# Patient Record
Sex: FEMALE | Race: WHITE | NOT HISPANIC OR LATINO | Employment: FULL TIME | URBAN - METROPOLITAN AREA
[De-identification: names, ages, dates, MRNs, and addresses within clinical notes are randomized per-mention and may not be internally consistent; named-entity substitution may affect disease eponyms.]

---

## 2017-01-07 ENCOUNTER — TELEPHONE (OUTPATIENT)
Dept: NURSING | Facility: CLINIC | Age: 27
End: 2017-01-07

## 2017-01-07 NOTE — TELEPHONE ENCOUNTER
"Call Type: Triage Call    Presenting Problem: Patient is\" pregnant 19 weeks\" [place] 06/06/17. and  has a fever of 101.3 tympanically along with cold symptoms. Patient  was advised to continue to drink plenty of fluids and take tylenol  as needed for \"fever and discomfort.\" Patient to follow up with  urgent care in am.  Triage Note:  Guideline Title: No Guideline - Advice Per Reference (Adult)  Recommended Disposition: See Provider within 24 hours  Original Inclination: Wanted to speak with a nurse  Override Disposition:  Intended Action: See Dr/Make Appt  Physician Contacted: No  SEE PROVIDER WITHIN 24 HOURS ?  YES  SEE ED IMMEDIATELY ? NO  CALL [facility] IMMEDIATELY ? NO  CALL LOCAL AGENCY IMMEDIATELY ? NO  SEE DENTIST WITHIN 4 HOURS ? NO  ACTIVATE  ? NO  SEE PROVIDER WITHIN 4 HOURS ? NO  CALL PROVIDER IMMEDIATELY ? NO  Physician Instructions:  Care Advice:  "

## 2017-01-09 ENCOUNTER — MYC MEDICAL ADVICE (OUTPATIENT)
Dept: OBGYN | Facility: OTHER | Age: 27
End: 2017-01-09

## 2017-01-09 ENCOUNTER — OFFICE VISIT (OUTPATIENT)
Dept: FAMILY MEDICINE | Facility: OTHER | Age: 27
End: 2017-01-09
Payer: COMMERCIAL

## 2017-01-09 VITALS
WEIGHT: 200 LBS | BODY MASS INDEX: 34.15 KG/M2 | TEMPERATURE: 98.1 F | OXYGEN SATURATION: 100 % | SYSTOLIC BLOOD PRESSURE: 122 MMHG | HEART RATE: 92 BPM | HEIGHT: 64 IN | DIASTOLIC BLOOD PRESSURE: 70 MMHG | RESPIRATION RATE: 20 BRPM

## 2017-01-09 DIAGNOSIS — H66.001 ACUTE SUPPURATIVE OTITIS MEDIA OF RIGHT EAR WITHOUT SPONTANEOUS RUPTURE OF TYMPANIC MEMBRANE, RECURRENCE NOT SPECIFIED: Primary | ICD-10-CM

## 2017-01-09 PROCEDURE — 99213 OFFICE O/P EST LOW 20 MIN: CPT | Performed by: PHYSICIAN ASSISTANT

## 2017-01-09 RX ORDER — AMOXICILLIN 500 MG/1
500 CAPSULE ORAL 2 TIMES DAILY
Qty: 20 CAPSULE | Refills: 0 | Status: SHIPPED | OUTPATIENT
Start: 2017-01-09 | End: 2017-01-16

## 2017-01-09 NOTE — MR AVS SNAPSHOT
After Visit Summary   1/9/2017    Scotty Garber    MRN: [id number]           Patient Information     Date Of Birth          1990        Visit Information        Provider Department      1/9/2017 11:45 AM Nhung Miner PA-C [facility]        Today's Diagnoses     Acute suppurative otitis media of right ear without spontaneous rupture of tympanic membrane, recurrence not specified    -  1       Care Instructions                    Middle Ear Infection (Otitis Media)            What is a middle ear infection?   A middle ear infection is an infection of the space in the ear behind the eardrum. Anyone can get an ear infection, but ear infections are more common in children less than 8 years old.   How does it occur?   Ear infections usually begin with a viral infection of the nose and throat. For example, a cold might lead to an infection of the ear. Ear infections may also occur when you have allergies. The viral infection or allergic reaction can cause swelling of the tube between your ear and throat (the eustachian tube). The swelling may trap bacteria in your middle ear, resulting in a bacterial infection.   Pressure from the buildup of pus or fluid in the ear sometimes causes the eardrum to tear (rupture). The eardrum is a thin membrane that separates and protects the delicate parts of the middle ear from the air and moisture in the ear canal.   What are the symptoms?   You may have one or more of these symptoms:   earache   hearing loss   feeling of blockage in the ear   fever   dizziness.   How is it diagnosed?   Your healthcare provider will ask about your symptoms and look at your eardrum.   Your healthcare provider may use a special light to look into the ear canal and check for fluid in the ear. Your provider will look at how the eardrum moves when a puff of air is blown into the ear. If there is fluid behind the eardrum, the membrane will not move  well.   How is it treated?   Antibiotic medicine is a common treatment for ear infections. However, recent studies have shown that the symptoms of ear infections often go away in a couple of days without antibiotics. Bacteria can become resistant to antibiotics, and the medicine may cause side effects. For these reasons, your healthcare provider may wait 1 to 3 days to see if the symptoms go away on their own before prescribing an antibiotic.   Your provider may recommend a decongestant (tablets or a nasal spray) to help clear the eustachian tube. This may help relieve pressure in the middle ear. For pain take a nonprescription pain reliever such as acetaminophen (Tylenol) or ibuprofen. Check with your healthcare provider before you give any medicine that contains aspirin or salicylates to a child or teen. This includes medicines like baby aspirin, some cold medicines, and Pepto Bismol. Children and teens who take aspirin are at risk for a serious illness called Reye's syndrome. Aspirin and ibuprofen are nonsteroidal anti-inflammatory medicines (NSAIDs). NSAIDs may cause stomach bleeding and other problems. These risks increase with age. Read the label and take as directed. Unless recommended by your healthcare provider, do not take NSAIDs for more than 10 days for any reason.   How long will the effects last?   In most cases you should feel better in 2 to 3 days.   If you are taking an antibiotic and your eardrum has not returned to normal when your provider examines you again, you may need to take a different antibiotic or other medicine. In this case, it may take another 1 to 2 weeks before your ear feels normal again.   What can I do to take care of myself?   Follow your healthcare provider's instructions.   If you are taking an antibiotic, take all of it according to the directions, even when the symptoms have gone away before you have finished it.   To help relieve pain, put a warm moist washcloth or a hot  water bottle over the ear.   If you have discharge from your ear, you can wipe it away with a washcloth and loosely plug the ear with cotton to catch further drainage. Discharge from the ear can mean that you have an infection of the ear canal or, if you have a lot of fluid and pus draining from your ear, you may have a ruptured eardrum. Ask your healthcare provider how to care for the ear if you have discharge. If the discharge is caused by a ruptured eardrum, then you will need to protect the ear from water. You will need one or more follow-up appointments to check the healing of your eardrum.   If you have a fever:   Rest until your temperature has fallen below 100?F (37.8?C). Then become as active as is comfortable.   Ask your provider if you can take aspirin, acetaminophen, or ibuprofen to control your fever.   Keep a daily record of your temperature.   Call your healthcare provider if you have:   a temperature of 101.5 degrees F (38.6 degrees C) or higher that persists even after you take acetaminophen, aspirin, or ibuprofen   a severe headache or worsening pain around the ear   swelling around the ear   increasing dizziness   worsening of hearing problems   weakness of one side of your face.   Keep all your appointments. Your healthcare provider may want you to have one or more follow-up exams until signs of inflammation and infection have disappeared.   How can I prevent an ear infection from occurring?   If you tend to get ear infections often, ask your healthcare provider if you need to be checked for allergies. Getting treatment for allergies may help prevent ear infections.   Ask if using decongestants when you have a cold may help prevent you from getting ear infections.         Published by [employer].  This content is reviewed periodically and is subject to change as new health information becomes available. The information is intended to inform and educate and is not a replacement for medical  evaluation, advice, diagnosis or treatment by a healthcare professional.   Developed by [employer].   ? 2010 [facility] and/or its affiliates. All Rights Reserved.   Copyright   [employer] 2011              Follow-ups after your visit        Your next 10 appointments already scheduled     Jan 16, 2017 10:30 AM   US OB > 14 WEEKS COMPLETE SINGLE with [facility]US[facility]   [facility]    [address] [address] [phone]           Please bring a list of your medicines (including vitamins, minerals and over-the-counter drugs). Also, tell your doctor about any allergies you may have. Wear comfortable clothes and leave your valuables at home.  If you re less than 20 weeks drink four 8-ounce glasses of fluid an hour before your exam. If you need to empty your bladder before your exam, try to release only a little urine. Then, drink another glass of fluid.  You may have up to two family members in the exam room. If you bring a small child, an adult must be there to care for him or her.  Please call the Imaging Department at your exam site with any questions.            Jan 16, 2017 11:15 AM   ESTABLISHED PRENATAL with Violette Baltazar, DO   [facility]    [address] [address] [phone]              Who to contact     If you have questions or need follow up information about today's clinic visit or your schedule please contact [facility] directly at [phone].  Normal or non-critical lab and imaging results will be communicated to you by MyChart, letter or phone within 4 business days after the clinic has received the results. If you do not hear from us within 7 days, please contact the clinic through MyChart or phone. If you have a critical or abnormal lab result, we will notify you by phone as soon as possible.  Submit refill requests through [employer] or call  "your pharmacy and they will forward the refill request to us. Please allow 3 business days for your refill to be completed.          Additional Information About Your Visit        [employer] Information     [employer] gives you secure access to your electronic health record. If you see a primary care provider, you can also send messages to your care team and make appointments. If you have questions, please call your primary care clinic.  If you do not have a primary care provider, please call [phone] and they will assist you.        Care EveryWhere ID     This is your Care EveryWhere ID. This could be used by other organizations to access your [facility] medical records  [id number]        Your Vitals Were     Pulse Temperature Respirations Height BMI (Body Mass Index) Pulse Oximetry    92 98.1  F (36.7  C) (Oral) 20 5' 3.78\" (1.62 m) 34.57 kg/m2 100%    Last Period                   08/30/2016            Blood Pressure from Last 3 Encounters:   01/09/17 122/70   12/28/16 118/54   12/26/16 102/68    Weight from Last 3 Encounters:   01/09/17 200 lb (90.719 kg)   12/28/16 202 lb (91.627 kg)   12/26/16 200 lb (90.719 kg)              Today, you had the following     No orders found for display         Today's Medication Changes          These changes are accurate as of: 1/9/17 12:12 PM.  If you have any questions, ask your nurse or doctor.               Start taking these medicines.        Dose/Directions    amoxicillin 500 MG capsule   Commonly known as:  AMOXIL   Used for:  Acute suppurative otitis media of right ear without spontaneous rupture of tympanic membrane, recurrence not specified   Started by:  Nhung Miner PA-C        Dose:  500 mg   Take 1 capsule (500 mg) by mouth 2 times daily for 10 days   Quantity:  20 capsule   Refills:  0            Where to get your medicines      These medications were sent to [place] [address] [place] [facility] [place] - [address]  [address], [place] [address] "     Phone:  [phone]    - amoxicillin 500 MG capsule             Primary Care Provider Office Phone # Fax #    Linda Tanna Ruffin [phone]        [facility] [address] [place] [address]        Thank you!     Thank you for choosing [facility]  for your care. Our goal is always to provide you with excellent care. Hearing back from our patients is one way we can continue to improve our services. Please take a few minutes to complete the written survey that you may receive in the mail after your visit with us. Thank you!             Your Updated Medication List - Protect others around you: Learn how to safely use, store and throw away your medicines at www.disposemymeds.org.          This list is accurate as of: 1/9/17 12:12 PM.  Always use your most recent med list.                   Brand Name Dispense Instructions for use    amoxicillin 500 MG capsule    AMOXIL    20 capsule    Take 1 capsule (500 mg) by mouth 2 times daily for 10 days       PRENATAL VITAMIN PO          VITAMIN C PO      Take 1,000 mg by mouth

## 2017-01-09 NOTE — PATIENT INSTRUCTIONS
Middle Ear Infection (Otitis Media)            What is a middle ear infection?   A middle ear infection is an infection of the space in the ear behind the eardrum. Anyone can get an ear infection, but ear infections are more common in children less than 8 years old.   How does it occur?   Ear infections usually begin with a viral infection of the nose and throat. For example, a cold might lead to an infection of the ear. Ear infections may also occur when you have allergies. The viral infection or allergic reaction can cause swelling of the tube between your ear and throat (the eustachian tube). The swelling may trap bacteria in your middle ear, resulting in a bacterial infection.   Pressure from the buildup of pus or fluid in the ear sometimes causes the eardrum to tear (rupture). The eardrum is a thin membrane that separates and protects the delicate parts of the middle ear from the air and moisture in the ear canal.   What are the symptoms?   You may have one or more of these symptoms:   earache   hearing loss   feeling of blockage in the ear   fever   dizziness.   How is it diagnosed?   Your healthcare provider will ask about your symptoms and look at your eardrum.   Your healthcare provider may use a special light to look into the ear canal and check for fluid in the ear. Your provider will look at how the eardrum moves when a puff of air is blown into the ear. If there is fluid behind the eardrum, the membrane will not move well.   How is it treated?   Antibiotic medicine is a common treatment for ear infections. However, recent studies have shown that the symptoms of ear infections often go away in a couple of days without antibiotics. Bacteria can become resistant to antibiotics, and the medicine may cause side effects. For these reasons, your healthcare provider may wait 1 to 3 days to see if the symptoms go away on their own before prescribing an antibiotic.   Your provider may recommend a  decongestant (tablets or a nasal spray) to help clear the eustachian tube. This may help relieve pressure in the middle ear. For pain take a nonprescription pain reliever such as acetaminophen (Tylenol) or ibuprofen. Check with your healthcare provider before you give any medicine that contains aspirin or salicylates to a child or teen. This includes medicines like baby aspirin, some cold medicines, and Pepto Bismol. Children and teens who take aspirin are at risk for a serious illness called Reye's syndrome. Aspirin and ibuprofen are nonsteroidal anti-inflammatory medicines (NSAIDs). NSAIDs may cause stomach bleeding and other problems. These risks increase with age. Read the label and take as directed. Unless recommended by your healthcare provider, do not take NSAIDs for more than 10 days for any reason.   How long will the effects last?   In most cases you should feel better in 2 to 3 days.   If you are taking an antibiotic and your eardrum has not returned to normal when your provider examines you again, you may need to take a different antibiotic or other medicine. In this case, it may take another 1 to 2 weeks before your ear feels normal again.   What can I do to take care of myself?   Follow your healthcare provider's instructions.   If you are taking an antibiotic, take all of it according to the directions, even when the symptoms have gone away before you have finished it.   To help relieve pain, put a warm moist washcloth or a hot water bottle over the ear.   If you have discharge from your ear, you can wipe it away with a washcloth and loosely plug the ear with cotton to catch further drainage. Discharge from the ear can mean that you have an infection of the ear canal or, if you have a lot of fluid and pus draining from your ear, you may have a ruptured eardrum. Ask your healthcare provider how to care for the ear if you have discharge. If the discharge is caused by a ruptured eardrum, then you will  need to protect the ear from water. You will need one or more follow-up appointments to check the healing of your eardrum.   If you have a fever:   Rest until your temperature has fallen below 100?F (37.8?C). Then become as active as is comfortable.   Ask your provider if you can take aspirin, acetaminophen, or ibuprofen to control your fever.   Keep a daily record of your temperature.   Call your healthcare provider if you have:   a temperature of 101.5 degrees F (38.6 degrees C) or higher that persists even after you take acetaminophen, aspirin, or ibuprofen   a severe headache or worsening pain around the ear   swelling around the ear   increasing dizziness   worsening of hearing problems   weakness of one side of your face.   Keep all your appointments. Your healthcare provider may want you to have one or more follow-up exams until signs of inflammation and infection have disappeared.   How can I prevent an ear infection from occurring?   If you tend to get ear infections often, ask your healthcare provider if you need to be checked for allergies. Getting treatment for allergies may help prevent ear infections.   Ask if using decongestants when you have a cold may help prevent you from getting ear infections.         Published by [employer].  This content is reviewed periodically and is subject to change as new health information becomes available. The information is intended to inform and educate and is not a replacement for medical evaluation, advice, diagnosis or treatment by a healthcare professional.   Developed by [employer].   ? 2010 [employer] and/or its affiliates. All Rights Reserved.   Copyright   [employer] 2011

## 2017-01-09 NOTE — PROGRESS NOTES
"  SUBJECTIVE:                                                    Scotty Garber is a 26 year old female who presents to clinic today for the following health issues:      HPI    Acute Illness   Acute illness concerns: sinus  Onset: 4 days     Fever: -102, some 99     Chills/Sweats: YES    Headache (location?): YES    Sinus Pressure:no    Conjunctivitis:  no    Ear Pain: YES: right    Rhinorrhea: no    Congestion: YES    Sore Throat: No     Cough: YES-productive of clear sputum    Wheeze: no    Decreased Appetite: no    Nausea: YES    Vomiting: Yes    Diarrhea:  no    Dysuria/Freq.: no    Fatigue/Achiness: YES    Sick/Strep Exposure: no      - 19 weeks pregnant        Therapies Tried and outcome: was seen at  and had flu and strep both were negative, pt thinks perhaps its sinus infection  - Nasal saline helps some   - Tylenol for fever, helps   - Vitamin C  - Netti pot   - Halls       Problem list and histories reviewed & adjusted, as indicated.  Additional history: as documented    Problem list, Medication list, Allergies, and Medical/Social/Surgical histories reviewed in [facility] and updated as appropriate.    ROS:  Constitutional, HEENT, cardiovascular, pulmonary, gi and gu systems are negative, except as otherwise noted.    OBJECTIVE:                                                    /70 mmHg  Pulse 92  Temp(Src) 98.1  F (36.7  C) (Oral)  Resp 20  Ht 5' 3.78\" (1.62 m)  Wt 200 lb (90.719 kg)  BMI 34.57 kg/m2  SpO2 100%  LMP 08/30/2016  Body mass index is 34.57 kg/(m^2).  GENERAL APPEARANCE: ill appearing, alert and no distress  EYES: Eyes grossly normal to inspection, PERRLA, conjunctivae and sclerae without injection or discharge, EOM intact   HENT: Bilateral ear canals without erythema or cerumen, bilateral TM's pearly grey with normal light reflex, serous effusion and injection on left, purulent effusion with injection and bulging on right, nasal turbinates with mild swelling & erythema, " yellow nasal discharge, mouth without ulcers or lesions, oropharynx clear and oral mucous membranes moist, no sinus tenderness   NECK: No adenopathy in cervical or supraclavicular regions  RESP: Lungs clear to auscultation - no rales, rhonchi or wheezes   CV: Regular rates and rhythm, normal S1 S2, no S3 or S4, no murmur, click or rub  MS: No musculoskeletal defects are noted and gait is age appropriate without ataxia   SKIN: No suspicious lesions or rashes, hydration status appears adeuqate with normal skin turgor   PSYCH: Alert and oriented x3; speech- coherent , normal rate and volume; able to articulate logical thoughts, able to abstract reason, no tangential thoughts, no hallucinations or delusions, mentation appears normal, Mood is euthymic. Affect is appropriate for this mood state and bright. Thought content is free of suicidal ideation, hallucinations, and delusions. Dress is adequate and upkept. Eye contact is good during conversation.       Diagnostic Test Results:  none      ASSESSMENT/PLAN:                                                        ICD-10-CM    1. Acute suppurative otitis media of right ear without spontaneous rupture of tympanic membrane, recurrence not specified H66.001 amoxicillin (AMOXIL) 500 MG capsule     - Discussed antibiotic use, duration, and side effects, reviewed pregnancy category B  - Should continue with OTC cares as above, safe for pregnancy  - Use Afrin once more tonight, then should d/c due to risk of recurrent congestion  - Rest and fluids   - Hand out given     The patient indicates understanding of these issues and agrees with the plan.    Follow up: RODOLFO Avalos-RUTH Abebe  [facility]

## 2017-01-09 NOTE — NURSING NOTE
"Chief Complaint   Patient presents with     Sinus Problem       Initial /70 mmHg  Pulse 92  Temp(Src) 98.1  F (36.7  C) (Oral)  Resp 20  Ht 5' 3.78\" (1.62 m)  Wt 200 lb (90.719 kg)  BMI 34.57 kg/m2  SpO2 100%  LMP 08/30/2016 Estimated body mass index is 34.57 kg/(m^2) as calculated from the following:    Height as of this encounter: 5' 3.78\" (1.62 m).    Weight as of this encounter: 200 lb (90.719 kg).  BP completed using cuff size: regular  "

## 2017-01-16 ENCOUNTER — RADIANT APPOINTMENT (OUTPATIENT)
Dept: ULTRASOUND IMAGING | Facility: OTHER | Age: 27
End: 2017-01-16
Attending: OBSTETRICS & GYNECOLOGY
Payer: COMMERCIAL

## 2017-01-16 ENCOUNTER — PRENATAL OFFICE VISIT (OUTPATIENT)
Dept: OBGYN | Facility: OTHER | Age: 27
End: 2017-01-16
Payer: COMMERCIAL

## 2017-01-16 VITALS
BODY MASS INDEX: 35.26 KG/M2 | DIASTOLIC BLOOD PRESSURE: 64 MMHG | SYSTOLIC BLOOD PRESSURE: 122 MMHG | HEART RATE: 88 BPM | WEIGHT: 204 LBS

## 2017-01-16 DIAGNOSIS — Z34.82 ENCOUNTER FOR SUPERVISION OF OTHER NORMAL PREGNANCY, SECOND TRIMESTER: ICD-10-CM

## 2017-01-16 DIAGNOSIS — Z34.92 NORMAL PREGNANCY IN SECOND TRIMESTER: Primary | ICD-10-CM

## 2017-01-16 PROCEDURE — 76805 OB US >/= 14 WKS SNGL FETUS: CPT

## 2017-01-16 PROCEDURE — 99207 ZZC PRENATAL VISIT: CPT | Performed by: OBSTETRICS & GYNECOLOGY

## 2017-01-16 ASSESSMENT — PAIN SCALES - GENERAL: PAINLEVEL: NO PAIN (0)

## 2017-01-16 NOTE — NURSING NOTE
"Chief Complaint   Patient presents with     Prenatal Care       Initial /64 mmHg  Pulse 88  Wt 204 lb (92.534 kg)  LMP 08/30/2016 Estimated body mass index is 35.26 kg/(m^2) as calculated from the following:    Height as of 1/9/17: 5' 3.78\" (1.62 m).    Weight as of this encounter: 204 lb (92.534 kg).  BP completed using cuff size: regular    19w6d    "

## 2017-01-16 NOTE — PATIENT INSTRUCTIONS
Prenatal Care  What is prenatal care?   Prenatal care is the care you receive when you are pregnant. It includes care given by your healthcare provider, support from your family, and an extra focus on giving yourself the care you need during this special time. Prenatal care improves your chances for a healthy pregnancy and healthy baby.   When should I see my healthcare provider?   Good care during pregnancy includes regularly scheduled prenatal exams.   If you are not yet pregnant but planning to get pregnant in the next few months, see your provider. Your provider may do some tests and talk about things you can do to have a healthy pregnancy and healthy baby.   You should schedule your first prenatal visit with your provider as soon as you think or know you are pregnant. Depending on your health and health history, your provider will probably schedule visits at least once a month for the first 6 months. During the 7th and 8th months you will see your provider every 2 weeks. During the last month you will probably see your provider once a week until you deliver your baby. If your pregnancy is high risk, your provider will probably want to see you more often. In some cases your provider may refer you to a medical specialist for more help with special needs, such as diabetes.   At each visit your healthcare provider will check to make sure that you and the baby are healthy. Regular visits can help you and your provider prevent possible problems. They can also help your provider find and treat any problems early. In addition to meeting your medical needs, your provider advise you about caring for yourself. You will talk about how to have a healthy diet and get plenty of exercise and rest. Your provider can also help you deal with the emotional changes that can happen during pregnancy.   What will happen at the first prenatal visit?   At your first visit, your provider will ask about your personal medical history. He  or she will also ask about the baby's father and your family health history. This information can help give your provider an idea of any problems you might have during your pregnancy. You will have a physical exam, including checks of your height, weight, and blood pressure and a pelvic exam. You will have a Pap test, urine tests, blood tests, and cultures of the cervix and vagina to check for infection.   Your provider will calculate your due date and the age of your baby. If your periods were regular before you got pregnant, and you are sure of the day when your last period started, your due date will be estimated to be 40 weeks from that day.   Your provider will talk to you about how to stay healthy during your pregnancy.   What will happen at other prenatal visits?   Your provider will check how you are doing and how the baby is developing. He or she will discuss how you are feeling, ask if you have any problems, and answer your questions.   During each prenatal visit your provider will:   weigh you   take your blood pressure   check your urine for sugar, protein, or bacteria   check your face, hands, ankles, and feet for swelling   listen to the baby's heartbeat   measure the size of the uterus to check the baby's growth.   At different times during the pregnancy, other exams and tests may be done. Some are routine and others are done only when a problem is suspected or you have a risk factor for a problem. Examples of other tests you might have are:   tests to check for genetic problems and some birth defects, such as:   chorionic villus sampling of cells from the placenta   amniocentesis to test the fluid around the baby   blood tests called triple or quad screens   ultrasound scans to check the baby's growth, development, and health and to look at your uterus, the amniotic sac, and the placenta   blood tests to check for diabetes   electronic monitoring to check the health of the baby.   How can I take care  of myself during my pregnancy?   Here are some things you can do to take good care of yourself during your pregnancy and prepare for the birth of your child:   Keep all appointments with your healthcare provider. Use these visits to discuss your pregnancy concerns or problems. Write down questions before each visit so that you will not forget about things you want to talk about.   Eat healthy meals that include whole grains, fresh fruits and vegetables, and calcium-rich foods, such as milk, cheese, and yogurt. Choose foods low in saturated fat. Do not eat uncooked or undercooked meats or fish.   Avoid certain fish with high levels of mercury. These fish include shark, ole mackerel, swordfish, and tilefish. Do not eat more than 12 ounces of fish per week, or no more than 6 ounces of tuna fish per week. Because albacore tuna fish is also high in mercury, choose light tuna.   Drink plenty of water each day.   Take vitamins, other supplements, and medicines as recommended by your provider.   Unless your healthcare provider tells you not to, try to be physically active for at least 30 minutes a day, most days of the week. If you are pressed for time, you might find it easier to exercise 10 minutes at a time, 3 times a day. Consider taking a prenatal exercise class.   Do not smoke, do not drink alcohol, and do not take illegal drugs.   Talk to your healthcare provider before you take any medicine, including nonprescription and herbal medicines. Some medicines are not safe during pregnancy.   Avoid hot tubs or saunas.   If you have cats in your home, do not empty the cat litter while you are pregnant. It may contain a parasite that causes an infection called toxoplasmosis, which can cause birth defects. Also, use gloves when you work in garden areas used by cats.   Stay away from toxic chemicals like insecticides, solvents (such as some  or paint thinners), lead, and mercury. Check labels on household products.  Most dangerous products have pregnancy warnings on their labels. Ask your healthcare provider about products if you are unsure.   Relax by taking breaks from work or chores.   Help reduce stress by sharing your feelings with others.   Report any violence or other types of abuse in your home.   Learn more about pregnancy, labor, and delivery. Read books, watch videos, go to a childbirth class, and talk with experienced moms.   Plan for the lifestyle changes a new baby will bring. Prepare for possible changes in your budget, work situation, daily schedule, and relationships with family and friends.   Talk to your provider about the pros and cons of breast-feeding.   Before and during your pregnancy, try to do everything you can to keep yourself and your baby healthy during your pregnancy.     Published by [employer].  This content is reviewed periodically and is subject to change as new health information becomes available. The information is intended to inform and educate and is not a replacement for medical evaluation, advice, diagnosis or treatment by a healthcare professional.   Developed by [employer].   ? 2010 [employer] and/or its affiliates. All Rights Reserved.   Copyright   [employer] 2011

## 2017-01-26 NOTE — PROGRESS NOTES
26 year old  at 19w6d weeks presents to the clinic for a routine prenatal visit.  Some anxiety concerns.  Patient has recently quit her job and is staying home now.     changed jobs.    Patient feels stable now but will keep me updated  No leakage of fluid, vaginal bleeding, or contractions   Good fetal movement.  FHTs: 156  Fundal height: s=d  Anatomy ultrasound in process  We discussed her repeat c section   RTC 4 weeks    Violette Baltazar   Aleisha called back is agreeable to organizing the testing before the clinic appts and then the procedure the day after if possible.    Orders placed for CT, MRCP w/secretin and EUS with possible ERCP.   EUS and ERCP sent to scheduling.    [place] [facility] [place] 07:45[place], check-in at 7:30am: NPO for 6 hours prior to test.   [place] [facility] [place] at 8:40am right afterwards: NPO for 2 hours prior to test.   Clinic appt with Dr Reinoso at 0645am.   Patient is scheduled on 03/01/2017 for an EUS possible ERCP combo. Added on at 8:15 A with an arrival time of 6:15 A. Will mail packet to patient.     All information given to Aleisha to convey to patient. Numbers given for future questions/concerns as well.     Mariam NELSON, RN Coordinator  Dr. Klein, Dr. Reinoso & Dr. Atkinson  Pancreas~Biliary  [phone] #4

## 2017-02-17 ENCOUNTER — PRENATAL OFFICE VISIT (OUTPATIENT)
Dept: OBGYN | Facility: OTHER | Age: 27
End: 2017-02-17
Payer: COMMERCIAL

## 2017-02-17 VITALS
WEIGHT: 212 LBS | SYSTOLIC BLOOD PRESSURE: 110 MMHG | DIASTOLIC BLOOD PRESSURE: 60 MMHG | BODY MASS INDEX: 36.64 KG/M2 | HEART RATE: 80 BPM

## 2017-02-17 DIAGNOSIS — Z34.92 NORMAL PREGNANCY IN SECOND TRIMESTER: ICD-10-CM

## 2017-02-17 LAB — HGB BLD-MCNC: 11.4 G/DL (ref 11.7–15.7)

## 2017-02-17 PROCEDURE — 36415 COLL VENOUS BLD VENIPUNCTURE: CPT | Performed by: OBSTETRICS & GYNECOLOGY

## 2017-02-17 PROCEDURE — 82950 GLUCOSE TEST: CPT | Performed by: OBSTETRICS & GYNECOLOGY

## 2017-02-17 PROCEDURE — 99207 ZZC PRENATAL VISIT: CPT | Performed by: OBSTETRICS & GYNECOLOGY

## 2017-02-17 PROCEDURE — 00000218 ZZHCL STATISTIC OBHBG - HEMOGLOBIN: Performed by: OBSTETRICS & GYNECOLOGY

## 2017-02-17 ASSESSMENT — PAIN SCALES - GENERAL: PAINLEVEL: NO PAIN (0)

## 2017-02-17 NOTE — NURSING NOTE
"Chief Complaint   Patient presents with     Prenatal Care       Initial /60 (BP Location: Left arm, Patient Position: Chair, Cuff Size: Adult Large)  Pulse 80  Wt 212 lb (96.2 kg)  LMP 08/30/2016  BMI 36.64 kg/m2 Estimated body mass index is 36.64 kg/(m^2) as calculated from the following:    Height as of 1/9/17: 5' 3.78\" (1.62 m).    Weight as of this encounter: 212 lb (96.2 kg).  Medication Reconciliation: complete   Parviz Alaniz MA  February 17, 2017      "

## 2017-02-17 NOTE — MR AVS SNAPSHOT
After Visit Summary   2/17/2017    Scotty Garber    MRN: [id number]           Patient Information     Date Of Birth          1990        Visit Information        Provider Department      2/17/2017 3:45 PM Violette Baltazar DO [facility]        Today's Diagnoses     Normal pregnancy in second trimester          Care Instructions    What to watch out for are: regular contractions every 5 min, vaginal bleeding, decreased fetal movement, or leakage of fluid.  Please call the office or go to L&D if you develop any of these signs and symptoms.      I will see you for your follow up appointment.  Please feel free to call if you have any questions or concerns.      Thanks,  Violette Baltazar, DO        Follow-ups after your visit        Follow-up notes from your care team     Return in about 4 weeks (around 3/17/2017).      Your next 10 appointments already scheduled     Mar 24, 2017  2:45 PM CDT   ESTABLISHED PRENATAL with Violette Baltazar DO   [facility]    [address] [facility] [id number] [phone]              Who to contact     If you have questions or need follow up information about today's clinic visit or your schedule please contact [facility] directly at [phone].  Normal or non-critical lab and imaging results will be communicated to you by [employer], letter or phone within 4 business days after the clinic has received the results. If you do not hear from us within 7 days, please contact the clinic through [employer] or phone. If you have a critical or abnormal lab result, we will notify you by phone as soon as possible.  Submit refill requests through [employer] or call your pharmacy and they will forward the refill request to us. Please allow 3 business days for your refill to be completed.          Additional Information About Your Visit        [employer] Information     [employer] gives you secure  access to your electronic health record. If you see a primary care provider, you can also send messages to your care team and make appointments. If you have questions, please call your primary care clinic.  If you do not have a primary care provider, please call [phone] and they will assist you.        Care EveryWhere ID     This is your Care EveryWhere ID. This could be used by other organizations to access your [place] medical records  [id number]        Your Vitals Were     Pulse Last Period BMI (Body Mass Index)             80 08/30/2016 36.64 kg/m2          Blood Pressure from Last 3 Encounters:   02/17/17 110/60   01/16/17 122/64   01/09/17 122/70    Weight from Last 3 Encounters:   02/17/17 212 lb (96.2 kg)   01/16/17 204 lb (92.5 kg)   01/09/17 200 lb (90.7 kg)              We Performed the Following     Glucose tolerance gest screen 1 hour     OB hemoglobin        Primary Care Provider Office Phone # Fax #    Linda Tanna Ruffin [phone]       [facility] [address] [place] [address]        Thank you!     Thank you for choosing [facility]  for your care. Our goal is always to provide you with excellent care. Hearing back from our patients is one way we can continue to improve our services. Please take a few minutes to complete the written survey that you may receive in the mail after your visit with us. Thank you!             Your Updated Medication List - Protect others around you: Learn how to safely use, store and throw away your medicines at www.disposemymeds.org.          This list is accurate as of: 2/17/17  3:51 PM.  Always use your most recent med list.                   Brand Name Dispense Instructions for use    PRENATAL VITAMIN PO

## 2017-02-17 NOTE — PATIENT INSTRUCTIONS
What to watch out for are: regular contractions every 5 min, vaginal bleeding, decreased fetal movement, or leakage of fluid.  Please call the office or go to L&D if you develop any of these signs and symptoms.      I will see you for your follow up appointment.  Please feel free to call if you have any questions or concerns.      Thanks,  Violette Baltazar, DO

## 2017-02-17 NOTE — PROGRESS NOTES
26 year old  at 24w3d weeks presents to the clinic for a routine prenatal visit.  No concerns.  No vaginal bleeding, leakage of fluid, or contractions   Good fetal movement.  FHTs= 140's  Fundal height= 26cm  Normal anatomy ultrasound  RTC 4 weeks  GCT today  Blood type:A+    Violette Baltazar

## 2017-02-18 LAB — GLUCOSE 1H P 50 G GLC PO SERPL-MCNC: 127 MG/DL (ref 60–129)

## 2017-03-24 ENCOUNTER — PRENATAL OFFICE VISIT (OUTPATIENT)
Dept: OBGYN | Facility: OTHER | Age: 27
End: 2017-03-24
Payer: COMMERCIAL

## 2017-03-24 VITALS
WEIGHT: 222 LBS | SYSTOLIC BLOOD PRESSURE: 120 MMHG | HEART RATE: 98 BPM | BODY MASS INDEX: 38.37 KG/M2 | DIASTOLIC BLOOD PRESSURE: 68 MMHG

## 2017-03-24 DIAGNOSIS — O34.219 DECLINES VBAC (VAGINAL BIRTH AFTER CESAREAN) TRIAL: Primary | ICD-10-CM

## 2017-03-24 DIAGNOSIS — Z23 NEED FOR TDAP VACCINATION: ICD-10-CM

## 2017-03-24 DIAGNOSIS — O09.293 HISTORY OF MACROSOMIA IN INFANT IN PRIOR PREGNANCY, CURRENTLY PREGNANT, THIRD TRIMESTER: ICD-10-CM

## 2017-03-24 PROCEDURE — 99207 ZZC PRENATAL VISIT: CPT | Performed by: OBSTETRICS & GYNECOLOGY

## 2017-03-24 ASSESSMENT — PAIN SCALES - GENERAL: PAINLEVEL: NO PAIN (0)

## 2017-03-24 NOTE — PROGRESS NOTES
26 year old  at 29w3d weeks presents to the clinic for a routine prenatal visit.  Some nausea and vaginal pressure.  No vaginal bleeding, leakage of fluid, or contractions   Good fetal movement.  Fundal height=31cm  DJRs=846's  GCT =127  Hgb=11.4  Rh A+  RCS=pt is wanting to have it scheduled for 6/2  RTC 2 weeks.  TDAP=pt declines      Violette Baltazar

## 2017-03-24 NOTE — MR AVS SNAPSHOT
After Visit Summary   3/24/2017    Scotty Garber    MRN: [id number]           Patient Information     Date Of Birth          1990        Visit Information        Provider Department      3/24/2017 2:45 PM Violette Blatazar DO [facility]        Today's Diagnoses     Declines  (vaginal birth after ) trial    -  1    History of macrosomia in infant in prior pregnancy, currently pregnant, third trimester        Need for Tdap vaccination          Care Instructions    What to watch out for are: regular contractions every 5 min, vaginal bleeding, decreased fetal movement, or leakage of fluid.  Please call the office or go to L&D if you develop any of these signs and symptoms.      I will see you for your follow up appointment.  Please feel free to call if you have any questions or concerns.      Thanks,  Violette Baltazar, DO          Follow-ups after your visit        Follow-up notes from your care team     Return in about 2 weeks (around 2017).      Your next 10 appointments already scheduled     2017  8:45 AM CDT   ESTABLISHED PRENATAL with JEFF Hall [facility] ([facility])    [address] [place] [address]   [phone]            2017  2:30 PM CDT   ESTABLISHED PRENATAL with Violette Baltazar DO   [facility] ([facility])    [address] [place] [id number] [phone]            May 05, 2017  3:00 PM CDT   ESTABLISHED PRENATAL with Violette Baltazar DO   [facility] ([facility])    [address] [place] [id number] [phone]              Who to contact     If you have questions or need follow up information about today's clinic visit or your schedule please contact [facility] directly at [phone].  Normal or non-critical lab and imaging results will be communicated to you by  MyChart, letter or phone within 4 business days after the clinic has received the results. If you do not hear from us within 7 days, please contact the clinic through [facility] or phone. If you have a critical or abnormal lab result, we will notify you by phone as soon as possible.  Submit refill requests through [facility] or call your pharmacy and they will forward the refill request to us. Please allow 3 business days for your refill to be completed.          Additional Information About Your Visit        [employer] Information     [facility] gives you secure access to your electronic health record. If you see a primary care provider, you can also send messages to your care team and make appointments. If you have questions, please call your primary care clinic.  If you do not have a primary care provider, please call [phone] and they will assist you.        Care EveryWhere ID     This is your Care EveryWhere ID. This could be used by other organizations to access your [place] medical records  [id number]        Your Vitals Were     Pulse Last Period BMI (Body Mass Index)             98 08/30/2016 38.37 kg/m2          Blood Pressure from Last 3 Encounters:   03/24/17 120/68   02/17/17 110/60   01/16/17 122/64    Weight from Last 3 Encounters:   03/24/17 222 lb (100.7 kg)   02/17/17 212 lb (96.2 kg)   01/16/17 204 lb (92.5 kg)              Today, you had the following     No orders found for display       Primary Care Provider Office Phone # Fax #    Linda Tanna Ruffin [phone]        [facility] [address] [place] [address]        Thank you!     Thank you for choosing [facility]  for your care. Our goal is always to provide you with excellent care. Hearing back from our patients is one way we can continue to improve our services. Please take a few minutes to complete the written survey that you may receive in the mail after your visit with us. Thank you!              Your Updated Medication List - Protect others around you: Learn how to safely use, store and throw away your medicines at www.disposemymeds.org.          This list is accurate as of: 3/24/17  3:44 PM.  Always use your most recent med list.                   Brand Name Dispense Instructions for use    PRENATAL VITAMIN PO

## 2017-03-24 NOTE — NURSING NOTE
"Chief Complaint   Patient presents with     Prenatal Care     TDAP ?       Initial /68  Pulse 98  Wt 222 lb (100.7 kg)  LMP 08/30/2016  BMI 38.37 kg/m2 Estimated body mass index is 38.37 kg/(m^2) as calculated from the following:    Height as of 1/9/17: 5' 3.78\" (1.62 m).    Weight as of this encounter: 222 lb (100.7 kg).  Medication Reconciliation: complete     29w3d    "

## 2017-03-28 ENCOUNTER — TELEPHONE (OUTPATIENT)
Dept: OBGYN | Facility: OTHER | Age: 27
End: 2017-03-28

## 2017-03-28 NOTE — TELEPHONE ENCOUNTER
Surgery Scheduled    Date of Surgery 17 Time of Surgery 12:30pm  Procedure: Repeat  Section  Hospital/Surgical Facility: [facility]  Surgeon: Dr Baltazar  Type of Anesthesia Anticipated: Spinal  Pre-Op: 17 with Dr Baltazar   Post-Op: patient to schedule with Dr Baltazar at 6 weeks post partum  Pre-Certification -to be completed  Consent Signed - to be completed  Hospital Stay - inpatient procedure 3 days    Surgery Packet (and/or) Colonscopy Prep (was given/or mailed) to patient. Patient was also instructed to arrive 1 1/2 hour(s) prior to surgery.  Patient understood and agrees to the plan.      Jessica Velez  Specialty    ________________________________________  Surgery Pre-Certification    Medical Record Number: [id number]  Scotty Garber  YOB: 1990   Phone: [phone] (home)   Primary Provider: Linda Ruffin    Reason for Admit:   Section    Surgeon: Dr Baltazar  Surgical Procedure: Repeat  Section  ICD-9 Coded: history of previous   Date of Surgery: 17  Consent signed? No    Date signed:   Hospital: [facility]  Inpatient- Length of stay:  3 days.    Requestor:  Cristal Velez     Location:  [facility]

## 2017-04-07 ENCOUNTER — PRENATAL OFFICE VISIT (OUTPATIENT)
Dept: OBGYN | Facility: OTHER | Age: 27
End: 2017-04-07
Payer: COMMERCIAL

## 2017-04-07 VITALS
SYSTOLIC BLOOD PRESSURE: 114 MMHG | BODY MASS INDEX: 39.32 KG/M2 | HEART RATE: 76 BPM | WEIGHT: 227.5 LBS | DIASTOLIC BLOOD PRESSURE: 66 MMHG

## 2017-04-07 DIAGNOSIS — O34.219 DECLINES VBAC (VAGINAL BIRTH AFTER CESAREAN) TRIAL: ICD-10-CM

## 2017-04-07 DIAGNOSIS — Z34.83 ENCOUNTER FOR SUPERVISION OF OTHER NORMAL PREGNANCY, THIRD TRIMESTER: Primary | ICD-10-CM

## 2017-04-07 PROCEDURE — 99207 ZZC PRENATAL VISIT: CPT | Performed by: ADVANCED PRACTICE MIDWIFE

## 2017-04-07 NOTE — PATIENT INSTRUCTIONS
Thank for choosing our clinic for your health care needs. We aim to provided you with excellent care. One way that we continue to improve our care is by listening to our patients. Please take a few minutes to complete the written survey that you may receive in the mail after your visit.     You may reach your care team by calling the following numbers:    [place]- [phone]   For clinic hours at [facility]  please visit the [place] web site [email or web address]    Notification of your lab results:  Normal or non-critical lab and imaging results will be communicated to you by Mychart, letter, or phone within 7 days. If you do not hear from us within 10 days, please contact us through Mychart or phone. If you have a critical or abnormal lab result, we will notify you by phone as soon as possible.      After Hours nurse advice:  [place] [facility] Ad[facility]:  [phone]     Medication Refills:  Please contact your pharmacy and they will forward the refill to us. Please allow 3 business days for your refills to be completed.     Secure access to your medical record:  Use AIM (secure email communication and access to your chart) to send your primary care provider a message or make an appointment. Ask someone on your Team how to sign up for AIM. To log on to [employer] or for more information in AIM please visit the website at [email or web address].      OBGYN Care Team               Janis Keil Day [facility]   Clinic hours: Tuesday 8-5 , Thursday 1145-7 and every other Friday 8-5 at [place]   Wednesday 8-5 at [place] Baltazar DO  Clinic hours 7-6 Monday at [place]  8-5 Tuesdays at [place]  7-12 Fridays [facility]  1-5 Fridays at [place]    Genevieve Gann MD  Clinic hours: [place] Monday and Thursday 8:15-5  [place] and Friday 8-5

## 2017-04-07 NOTE — NURSING NOTE
"Chief Complaint   Patient presents with     Prenatal Care       Initial /66 (BP Location: Right arm, Patient Position: Chair, Cuff Size: Adult Large)  Pulse 76  Wt 227 lb 8 oz (103.2 kg)  LMP 08/30/2016  BMI 39.32 kg/m2 Estimated body mass index is 39.32 kg/(m^2) as calculated from the following:    Height as of 1/9/17: 5' 3.78\" (1.62 m).    Weight as of this encounter: 227 lb 8 oz (103.2 kg).  Medication Reconciliation: complete   Angela Casey CMA      "

## 2017-04-24 ENCOUNTER — RADIANT APPOINTMENT (OUTPATIENT)
Dept: ULTRASOUND IMAGING | Facility: OTHER | Age: 27
End: 2017-04-24
Attending: OBSTETRICS & GYNECOLOGY
Payer: COMMERCIAL

## 2017-04-24 ENCOUNTER — PRENATAL OFFICE VISIT (OUTPATIENT)
Dept: OBGYN | Facility: OTHER | Age: 27
End: 2017-04-24
Payer: COMMERCIAL

## 2017-04-24 VITALS
SYSTOLIC BLOOD PRESSURE: 114 MMHG | DIASTOLIC BLOOD PRESSURE: 60 MMHG | WEIGHT: 232 LBS | BODY MASS INDEX: 40.1 KG/M2 | HEART RATE: 86 BPM

## 2017-04-24 DIAGNOSIS — Z34.93 NORMAL PREGNANCY IN THIRD TRIMESTER: Primary | ICD-10-CM

## 2017-04-24 DIAGNOSIS — O34.219 DECLINES VBAC (VAGINAL BIRTH AFTER CESAREAN) TRIAL: ICD-10-CM

## 2017-04-24 DIAGNOSIS — Z34.93 NORMAL PREGNANCY IN THIRD TRIMESTER: ICD-10-CM

## 2017-04-24 DIAGNOSIS — Z23 NEED FOR TDAP VACCINATION: ICD-10-CM

## 2017-04-24 PROCEDURE — 99207 ZZC PRENATAL VISIT: CPT | Performed by: OBSTETRICS & GYNECOLOGY

## 2017-04-24 PROCEDURE — 90715 TDAP VACCINE 7 YRS/> IM: CPT | Performed by: OBSTETRICS & GYNECOLOGY

## 2017-04-24 PROCEDURE — 76816 OB US FOLLOW-UP PER FETUS: CPT

## 2017-04-24 PROCEDURE — 90471 IMMUNIZATION ADMIN: CPT | Performed by: OBSTETRICS & GYNECOLOGY

## 2017-04-24 ASSESSMENT — PAIN SCALES - GENERAL: PAINLEVEL: NO PAIN (0)

## 2017-04-24 NOTE — MR AVS SNAPSHOT
After Visit Summary   2017    Scotty Garber    MRN: [id number]           Patient Information     Date Of Birth          1990        Visit Information        Provider Department      2017 8:30 AM Violette Baltazar DO [facility]        Today's Diagnoses     Normal pregnancy in third trimester    -  1    Declines  (vaginal birth after ) trial        Need for Tdap vaccination          Care Instructions    SIGNS OF  LABOR    Labor is  if it happens more than three weeks before your due date.    It can be hard to know if you are in labor, since the symptoms can be like the normal feelings of pregnancy.  Often, the only difference is the symptoms increase or they don't go away.     Signs of  labor can include:    Change in your vaginal discharge:  You will have more vaginal discharge when you are pregnant and it should be creamy white.  Call the clinic right away if your discharge has a foul odor, pink or bloody,  or if it becomes watery or is more than is normal for you during your pregnancy.    More than 5-6 contractions or tightenings per hour.  Contractions can feel like period cramps or bowel (gas or diarrhea) pain.  You will feel it in the lower part of your abdomen, in your back or as a pressure feeling in your bottom.  It is often regular, coming for 30 seconds or a minute and then going away, only to come back 5 or 10 minutes later. Some contractions are normal during pregnancy, but if you are feeling more than 5-6 in one hour, empty your bladder, then drink 16-24 ounces of water, eat a snack and lay down on your left side. Put your hand on your abdomen to count the contractions.  If after one hour of resting you have still had 5-6 contractions call your clinic.        Follow-ups after your visit        Follow-up notes from your care team     Return in about 2 weeks (around 2017).      Your next 10 appointments already  scheduled     May 05, 2017  3:00 PM CDT   ESTABLISHED PRENATAL with Violette Baltazar DO   [facility] ([facility])    [address] [facility] [id number] [phone]            May 23, 2017  9:00 AM CDT   ESTABLISHED PRENATAL with Violette Baltazar DO   [facility] ([facility])    [address] [place] [id number] [phone]              Future tests that were ordered for you today     Open Future Orders        Priority Expected Expires Ordered    US OB Single Follow Up Repeat Routine  4/28/2017 4/24/2017            Who to contact     If you have questions or need follow up information about today's clinic visit or your schedule please contact [facility] directly at [phone].  Normal or non-critical lab and imaging results will be communicated to you by MyChart, letter or phone within 4 business days after the clinic has received the results. If you do not hear from us within 7 days, please contact the clinic through Vesethart or phone. If you have a critical or abnormal lab result, we will notify you by phone as soon as possible.  Submit refill requests through Medigo or call your pharmacy and they will forward the refill request to us. Please allow 3 business days for your refill to be completed.          Additional Information About Your Visit        MyChart Information     Medigo gives you secure access to your electronic health record. If you see a primary care provider, you can also send messages to your care team and make appointments. If you have questions, please call your primary care clinic.  If you do not have a primary care provider, please call [phone] and they will assist you.        Care EveryWhere ID     This is your Care EveryWhere ID. This could be used by other organizations to access your [place] medical records  [id number]        Your Vitals Were     Pulse Last Period  BMI (Body Mass Index)             86 08/30/2016 40.1 kg/m2          Blood Pressure from Last 3 Encounters:   04/24/17 114/60   04/07/17 114/66   03/24/17 120/68    Weight from Last 3 Encounters:   04/24/17 232 lb (105.2 kg)   04/07/17 227 lb 8 oz (103.2 kg)   03/24/17 222 lb (100.7 kg)              We Performed the Following     1st  Administration  [57695]     TDAP VACCINE (ADACEL) [72266.002]        Primary Care Provider Office Phone # Fax #    Linda Tanna Ruffin [phone]        [facility] [address] [place] [address]        Thank you!     Thank you for choosing [facility]  for your care. Our goal is always to provide you with excellent care. Hearing back from our patients is one way we can continue to improve our services. Please take a few minutes to complete the written survey that you may receive in the mail after your visit with us. Thank you!             Your Updated Medication List - Protect others around you: Learn how to safely use, store and throw away your medicines at www.disposemymeds.org.          This list is accurate as of: 4/24/17  8:51 AM.  Always use your most recent med list.                   Brand Name Dispense Instructions for use    PRENATAL VITAMIN PO

## 2017-04-24 NOTE — PATIENT INSTRUCTIONS
SIGNS OF  LABOR    Labor is  if it happens more than three weeks before your due date.    It can be hard to know if you are in labor, since the symptoms can be like the normal feelings of pregnancy.  Often, the only difference is the symptoms increase or they don't go away.     Signs of  labor can include:    Change in your vaginal discharge:  You will have more vaginal discharge when you are pregnant and it should be creamy white.  Call the clinic right away if your discharge has a foul odor, pink or bloody,  or if it becomes watery or is more than is normal for you during your pregnancy.    More than 5-6 contractions or tightenings per hour.  Contractions can feel like period cramps or bowel (gas or diarrhea) pain.  You will feel it in the lower part of your abdomen, in your back or as a pressure feeling in your bottom.  It is often regular, coming for 30 seconds or a minute and then going away, only to come back 5 or 10 minutes later. Some contractions are normal during pregnancy, but if you are feeling more than 5-6 in one hour, empty your bladder, then drink 16-24 ounces of water, eat a snack and lay down on your left side. Put your hand on your abdomen to count the contractions.  If after one hour of resting you have still had 5-6 contractions call your clinic.

## 2017-04-24 NOTE — PROGRESS NOTES
26 year old  at 33w6d weeks presents to the clinic for a routine prenatal visit.    No concerns.  Patient denies any vaginal bleeding, leakage of fluid, or contractions     Good fetal movement  Fundal height=36cm.  S>D.  Will get a growth ultrasound   WRYc=873  RCS=  Discussed labor precautions.  RTC 2 weeks    Violette Baltazar

## 2017-04-24 NOTE — NURSING NOTE
"Chief Complaint   Patient presents with     Prenatal Care     T-dap       Initial /60 (BP Location: Left arm)  Pulse 86  Wt 232 lb (105.2 kg)  LMP 08/30/2016  BMI 40.1 kg/m2 Estimated body mass index is 40.1 kg/(m^2) as calculated from the following:    Height as of 1/9/17: 5' 3.78\" (1.62 m).    Weight as of this encounter: 232 lb (105.2 kg).  Medication Reconciliation: complete         33w6d  T-Dap  "

## 2017-04-25 ENCOUNTER — MYC MEDICAL ADVICE (OUTPATIENT)
Dept: OBGYN | Facility: OTHER | Age: 27
End: 2017-04-25

## 2017-05-05 ENCOUNTER — PRENATAL OFFICE VISIT (OUTPATIENT)
Dept: OBGYN | Facility: OTHER | Age: 27
End: 2017-05-05
Payer: COMMERCIAL

## 2017-05-05 VITALS
HEART RATE: 88 BPM | WEIGHT: 236 LBS | DIASTOLIC BLOOD PRESSURE: 74 MMHG | BODY MASS INDEX: 40.79 KG/M2 | SYSTOLIC BLOOD PRESSURE: 124 MMHG

## 2017-05-05 DIAGNOSIS — Z34.93 NORMAL PREGNANCY IN THIRD TRIMESTER: Primary | ICD-10-CM

## 2017-05-05 DIAGNOSIS — O34.219 DECLINES VBAC (VAGINAL BIRTH AFTER CESAREAN) TRIAL: ICD-10-CM

## 2017-05-05 DIAGNOSIS — O09.293 HISTORY OF MACROSOMIA IN INFANT IN PRIOR PREGNANCY, CURRENTLY PREGNANT, THIRD TRIMESTER: ICD-10-CM

## 2017-05-05 PROCEDURE — 99207 ZZC PRENATAL VISIT: CPT | Performed by: OBSTETRICS & GYNECOLOGY

## 2017-05-05 ASSESSMENT — PAIN SCALES - GENERAL: PAINLEVEL: NO PAIN (0)

## 2017-05-05 NOTE — MR AVS SNAPSHOT
After Visit Summary   2017    Scotty Garber    MRN: [id number]           Patient Information     Date Of Birth          1990        Visit Information        Provider Department      2017 3:00 PM Violette Baltazar DO [facility]        Today's Diagnoses     Normal pregnancy in third trimester    -  1    Declines  (vaginal birth after ) trial        History of macrosomia in infant in prior pregnancy, currently pregnant, third trimester          Care Instructions    What to watch out for are: regular contractions every 5 min, vaginal bleeding, decreased fetal movement, or leakage of fluid.  Please call the office or go to L&D if you develop any of these signs and symptoms.      I will see you for your follow up appointment.  Please feel free to call if you have any questions or concerns.      Thanks,  Violette Baltazar, DO          Follow-ups after your visit        Follow-up notes from your care team     Return in about 1 week (around 2017).      Your next 10 appointments already scheduled     May 12, 2017  4:15 PM CDT   ESTABLISHED PRENATAL with Violette Baltazar DO   [facility] ([facility])    [address] [facility] [id number] [phone]            May 23, 2017  9:00 AM CDT   ESTABLISHED PRENATAL with Violette Baltazar DO   [facility] ([facility])    [address] [place] [address] [phone]              Who to contact     If you have questions or need follow up information about today's clinic visit or your schedule please contact [facility] directly at [phone].  Normal or non-critical lab and imaging results will be communicated to you by MyChart, letter or phone within 4 business days after the clinic has received the results. If you do not hear from us within 7 days, please contact the clinic through MyChart or phone. If  you have a critical or abnormal lab result, we will notify you by phone as soon as possible.  Submit refill requests through Acarix or call your pharmacy and they will forward the refill request to us. Please allow 3 business days for your refill to be completed.          Additional Information About Your Visit        AllTrailshart Information     Acarix gives you secure access to your electronic health record. If you see a primary care provider, you can also send messages to your care team and make appointments. If you have questions, please call your primary care clinic.  If you do not have a primary care provider, please call [phone] and they will assist you.        Care EveryWhere ID     This is your Care EveryWhere ID. This could be used by other organizations to access your [place] medical records  [id number]        Your Vitals Were     Pulse Last Period BMI (Body Mass Index)             88 08/30/2016 40.79 kg/m2          Blood Pressure from Last 3 Encounters:   05/05/17 124/74   04/24/17 114/60   04/07/17 114/66    Weight from Last 3 Encounters:   05/05/17 236 lb (107 kg)   04/24/17 232 lb (105.2 kg)   04/07/17 227 lb 8 oz (103.2 kg)              Today, you had the following     No orders found for display       Primary Care Provider Office Phone # Fax #    Linda Tanna Ruffin [phone]       [facility] [address] [place] [address]        Thank you!     Thank you for choosing [facility]  for your care. Our goal is always to provide you with excellent care. Hearing back from our patients is one way we can continue to improve our services. Please take a few minutes to complete the written survey that you may receive in the mail after your visit with us. Thank you!             Your Updated Medication List - Protect others around you: Learn how to safely use, store and throw away your medicines at www.disposemymeds.org.          This list is accurate as  of: 5/5/17  3:36 PM.  Always use your most recent med list.                   Brand Name Dispense Instructions for use    PRENATAL VITAMIN PO

## 2017-05-05 NOTE — NURSING NOTE
"Chief Complaint   Patient presents with     Prenatal Care       Initial /74 (BP Location: Right arm)  Pulse 88  Wt 236 lb (107 kg)  LMP 08/30/2016  BMI 40.79 kg/m2 Estimated body mass index is 40.79 kg/(m^2) as calculated from the following:    Height as of 1/9/17: 5' 3.78\" (1.62 m).    Weight as of this encounter: 236 lb (107 kg).  Medication Reconciliation: complete     35w3d    "

## 2017-05-05 NOTE — PROGRESS NOTES
26 year old  at 35w3d weeks presents to the clinic for a routine prenatal visit.    No concerns.  Patient denies any vaginal bleeding, leakage of fluid, or contractions     Good fetal movement  Fundal height=39cm  AYDg=116  RCS=.  Would like 12:30 on   Discussed labor precautions.  RETURN TO CLINIC 1 week     Violette Baltazar

## 2017-05-12 ENCOUNTER — PRENATAL OFFICE VISIT (OUTPATIENT)
Dept: OBGYN | Facility: OTHER | Age: 27
End: 2017-05-12
Payer: COMMERCIAL

## 2017-05-12 VITALS
HEART RATE: 78 BPM | DIASTOLIC BLOOD PRESSURE: 80 MMHG | SYSTOLIC BLOOD PRESSURE: 110 MMHG | WEIGHT: 238 LBS | BODY MASS INDEX: 41.13 KG/M2

## 2017-05-12 DIAGNOSIS — O34.219 DECLINES VBAC (VAGINAL BIRTH AFTER CESAREAN) TRIAL: ICD-10-CM

## 2017-05-12 DIAGNOSIS — Z34.93 NORMAL PREGNANCY IN THIRD TRIMESTER: Primary | ICD-10-CM

## 2017-05-12 DIAGNOSIS — O09.293 HISTORY OF MACROSOMIA IN INFANT IN PRIOR PREGNANCY, CURRENTLY PREGNANT, THIRD TRIMESTER: ICD-10-CM

## 2017-05-12 PROCEDURE — 99207 ZZC PRENATAL VISIT: CPT | Performed by: OBSTETRICS & GYNECOLOGY

## 2017-05-12 PROCEDURE — 87653 STREP B DNA AMP PROBE: CPT | Performed by: OBSTETRICS & GYNECOLOGY

## 2017-05-12 NOTE — NURSING NOTE
"Chief Complaint   Patient presents with     Prenatal Care     GBS due       Initial /80 (BP Location: Right arm, Patient Position: Chair, Cuff Size: Adult Regular)  Pulse 78  Wt 238 lb (108 kg)  LMP 08/30/2016  BMI 41.13 kg/m2 Estimated body mass index is 41.13 kg/(m^2) as calculated from the following:    Height as of 1/9/17: 5' 3.78\" (1.62 m).    Weight as of this encounter: 238 lb (108 kg).  Medication Reconciliation: complete     Jesenia Portillo, CMA  May 12, 2017    "

## 2017-05-12 NOTE — PROGRESS NOTES
26 year old  at 36w3d weeks presents to the clinic for a routine prenatal visit.  No concerns.  No vaginal bleeding, leakage of fluid, or contractions  Fundal height=39cm  YQTo=233's  CX=Cl/30/-2  GBS done today  RCS=  Labor precautions discussed  RTC 1 week    Violette Baltazar

## 2017-05-12 NOTE — MR AVS SNAPSHOT
After Visit Summary   2017    Scotty Garber    MRN: [id number]           Patient Information     Date Of Birth          1990        Visit Information        Provider Department      2017 4:15 PM Violette Baltazar DO [facility]        Today's Diagnoses     Normal pregnancy in third trimester    -  1    Declines  (vaginal birth after ) trial        History of macrosomia in infant in prior pregnancy, currently pregnant, third trimester          Care Instructions    What to watch out for are: regular contractions every 5 min, vaginal bleeding, decreased fetal movement, or leakage of fluid.  Please call the office or go to L&D if you develop any of these signs and symptoms.      I will see you for your follow up appointment.  Please feel free to call if you have any questions or concerns.      Thanks,  Violette Baltazar, DO          Follow-ups after your visit        Follow-up notes from your care team     Return in about 1 week (around 2017).      Your next 10 appointments already scheduled     May 19, 2017  8:45 AM CDT   ESTABLISHED PRENATAL with Violette Baltazar DO   [facility] ([facility]    [address] [place] [address] [address]  [facility] [id number] [phone]            May 23, 2017  9:00 AM CDT   ESTABLISHED PRENATAL with Violette Baltazar DO   [facility]    [address] [place] [address] [phone]              Who to contact     If you have questions or need follow up information about today's clinic visit or your schedule please contact [facility] directly at [phone].  Normal or non-critical lab and imaging results will be communicated to you by MyChart, letter or phone within 4 business days after the clinic has received the results. If you do not hear from us within 7 days, please contact the clinic through Ruckus Wirelesshart or  phone. If you have a critical or abnormal lab result, we will notify you by phone as soon as possible.  Submit refill requests through hopTo or call your pharmacy and they will forward the refill request to us. Please allow 3 business days for your refill to be completed.          Additional Information About Your Visit        Specialized Techhart Information     hopTo gives you secure access to your electronic health record. If you see a primary care provider, you can also send messages to your care team and make appointments. If you have questions, please call your primary care clinic.  If you do not have a primary care provider, please call [phone] and they will assist you.        Care EveryWhere ID     This is your Care EveryWhere ID. This could be used by other organizations to access your [place] medical records  [id number]        Your Vitals Were     Pulse Last Period BMI (Body Mass Index)             78 08/30/2016 41.13 kg/m2          Blood Pressure from Last 3 Encounters:   05/12/17 110/80   05/05/17 124/74   04/24/17 114/60    Weight from Last 3 Encounters:   05/12/17 238 lb (108 kg)   05/05/17 236 lb (107 kg)   04/24/17 232 lb (105.2 kg)              We Performed the Following     Strep, Group B by PCR        Primary Care Provider Office Phone # Fax #    Linda Tanna Ruffin [phone]       [facility] [address] [place] [address]        Thank you!     Thank you for choosing [facility]  for your care. Our goal is always to provide you with excellent care. Hearing back from our patients is one way we can continue to improve our services. Please take a few minutes to complete the written survey that you may receive in the mail after your visit with us. Thank you!             Your Updated Medication List - Protect others around you: Learn how to safely use, store and throw away your medicines at www.disposemymeds.org.          This list is accurate as of:  5/12/17  4:29 PM.  Always use your most recent med list.                   Brand Name Dispense Instructions for use    PRENATAL VITAMIN PO

## 2017-05-14 LAB
GP B STREP DNA SPEC QL NAA+PROBE: NORMAL
SPECIMEN SOURCE: NORMAL

## 2017-05-19 ENCOUNTER — PRENATAL OFFICE VISIT (OUTPATIENT)
Dept: OBGYN | Facility: CLINIC | Age: 27
End: 2017-05-19
Payer: COMMERCIAL

## 2017-05-19 VITALS
DIASTOLIC BLOOD PRESSURE: 70 MMHG | HEART RATE: 88 BPM | BODY MASS INDEX: 41.31 KG/M2 | WEIGHT: 239 LBS | SYSTOLIC BLOOD PRESSURE: 124 MMHG

## 2017-05-19 DIAGNOSIS — O34.219 DECLINES VBAC (VAGINAL BIRTH AFTER CESAREAN) TRIAL: Primary | Chronic | ICD-10-CM

## 2017-05-19 PROCEDURE — 99207 ZZC PRENATAL VISIT: CPT | Performed by: OBSTETRICS & GYNECOLOGY

## 2017-05-19 ASSESSMENT — PAIN SCALES - GENERAL: PAINLEVEL: NO PAIN (0)

## 2017-05-19 NOTE — MR AVS SNAPSHOT
After Visit Summary   2017    Scotty Garber    MRN: [id number]           Patient Information     Date Of Birth          1990        Visit Information        Provider Department      2017 8:45 AM Violette Baltazar DO [facility]        Today's Diagnoses     Declines  (vaginal birth after ) trial    -  1      Care Instructions    What to watch out for are: regular contractions every 5 min, vaginal bleeding, decreased fetal movement, or leakage of fluid.  Please call the office or go to L&D if you develop any of these signs and symptoms.      I will see you for your follow up appointment.  Please feel free to call if you have any questions or concerns.      Thanks,  Violette Baltazar, DO          Follow-ups after your visit        Follow-up notes from your care team     Return in about 1 week (around 2017).      Your next 10 appointments already scheduled     May 23, 2017  9:00 AM CDT   ESTABLISHED PRENATAL with Violette Baltazar DO   [address] [address] [phone]              Who to contact     If you have questions or need follow up information about today's clinic visit or your schedule please contact [facility] directly at [phone].  Normal or non-critical lab and imaging results will be communicated to you by MyChart, letter or phone within 4 business days after the clinic has received the results. If you do not hear from us within 7 days, please contact the clinic through MyChart or phone. If you have a critical or abnormal lab result, we will notify you by phone as soon as possible.  Submit refill requests through [email or web address] or call your pharmacy and they will forward the refill request to us. Please allow 3 business days for your refill to be completed.          Additional Information About Your Visit        MyChart Information      [employer] gives you secure access to your electronic health record. If you see a primary care provider, you can also send messages to your care team and make appointments. If you have questions, please call your primary care clinic.  If you do not have a primary care provider, please call [phone] and they will assist you.        Care EveryWhere ID     This is your Care EveryWhere ID. This could be used by other organizations to access your [place] medical records  [id number]        Your Vitals Were     Pulse Last Period BMI (Body Mass Index)             88 08/30/2016 41.31 kg/m2          Blood Pressure from Last 3 Encounters:   05/19/17 124/70   05/12/17 110/80   05/05/17 124/74    Weight from Last 3 Encounters:   05/19/17 108.4 kg (239 lb)   05/12/17 108 kg (238 lb)   05/05/17 107 kg (236 lb)              Today, you had the following     No orders found for display       Primary Care Provider Office Phone # Fax #    Linda Tanna Ruffin [phone]       [facility] [address] [place] [address]        Thank you!     Thank you for choosing [facility]  for your care. Our goal is always to provide you with excellent care. Hearing back from our patients is one way we can continue to improve our services. Please take a few minutes to complete the written survey that you may receive in the mail after your visit with us. Thank you!             Your Updated Medication List - Protect others around you: Learn how to safely use, store and throw away your medicines at www.disposemymeds.org.          This list is accurate as of: 5/19/17  9:05 AM.  Always use your most recent med list.                   Brand Name Dispense Instructions for use    PRENATAL VITAMIN PO

## 2017-05-19 NOTE — PROGRESS NOTES
26 year old  at 37w3d weeks presents to the clinic for a routine prenatal visit.  No concerns.  Complains of feeling more pressure.  No vaginal bleeding, leakage of fluid, or contractions   Fundal height=41cm  XWVs=687  RCS=  CX=deferred per patient request  Discussed labor precautions  RTC 1 week  GBS=negative    Violette Baltazar

## 2017-05-19 NOTE — NURSING NOTE
"Chief Complaint   Patient presents with     Prenatal Care     Had GBS       Initial /70 (BP Location: Left arm)  Pulse 88  Wt 108.4 kg (239 lb)  LMP 08/30/2016  BMI 41.31 kg/m2 Estimated body mass index is 41.31 kg/(m^2) as calculated from the following:    Height as of 1/9/17: 1.62 m (5' 3.78\").    Weight as of this encounter: 108.4 kg (239 lb).  Medication Reconciliation: complete  "

## 2017-05-23 ENCOUNTER — PRENATAL OFFICE VISIT (OUTPATIENT)
Dept: OBGYN | Facility: CLINIC | Age: 27
End: 2017-05-23
Payer: COMMERCIAL

## 2017-05-23 VITALS
DIASTOLIC BLOOD PRESSURE: 74 MMHG | HEART RATE: 99 BPM | BODY MASS INDEX: 41.48 KG/M2 | SYSTOLIC BLOOD PRESSURE: 120 MMHG | WEIGHT: 240 LBS

## 2017-05-23 DIAGNOSIS — O09.293 HISTORY OF MACROSOMIA IN INFANT IN PRIOR PREGNANCY, CURRENTLY PREGNANT, THIRD TRIMESTER: ICD-10-CM

## 2017-05-23 DIAGNOSIS — O34.219 DECLINES VBAC (VAGINAL BIRTH AFTER CESAREAN) TRIAL: Primary | ICD-10-CM

## 2017-05-23 DIAGNOSIS — Z86.59 HISTORY OF POSTPARTUM DEPRESSION: ICD-10-CM

## 2017-05-23 DIAGNOSIS — Z87.59 HISTORY OF POSTPARTUM DEPRESSION: ICD-10-CM

## 2017-05-23 PROCEDURE — 99207 ZZC PRENATAL VISIT: CPT | Performed by: OBSTETRICS & GYNECOLOGY

## 2017-05-23 ASSESSMENT — PAIN SCALES - GENERAL: PAINLEVEL: NO PAIN (0)

## 2017-05-23 NOTE — MR AVS SNAPSHOT
After Visit Summary   2017    Scotty Garber    MRN: [id number]           Patient Information     Date Of Birth          1990        Visit Information        Provider Department      2017 9:00 AM Violette Baltazar,  [facility]        Today's Diagnoses     Declines  (vaginal birth after ) trial    -  1    History of macrosomia in infant in prior pregnancy, currently pregnant, third trimester        History of postpartum depression          Care Instructions    What to watch out for are: regular contractions every 5 min, vaginal bleeding, decreased fetal movement, or leakage of fluid.  Please call the office or go to L&D if you develop any of these signs and symptoms.      I will see you for your follow up appointment.  Please feel free to call if you have any questions or concerns.      Thanks,  Violette Baltazar, DO          Follow-ups after your visit        Follow-up notes from your care team     Return in about 1 week (around 2017).      Who to contact     If you have questions or need follow up information about today's clinic visit or your schedule please contact [facility] directly at [phone].  Normal or non-critical lab and imaging results will be communicated to you by Siftithart, letter or phone within 4 business days after the clinic has received the results. If you do not hear from us within 7 days, please contact the clinic through [employer] or phone. If you have a critical or abnormal lab result, we will notify you by phone as soon as possible.  Submit refill requests through [facility] or call your pharmacy and they will forward the refill request to us. Please allow 3 business days for your refill to be completed.          Additional Information About Your Visit        [employer] Information     [facility] gives you secure access to your electronic health record. If you see a primary care provider, you can also send  messages to your care team and make appointments. If you have questions, please call your primary care clinic.  If you do not have a primary care provider, please call [phone] and they will assist you.        Care EveryWhere ID     This is your Care EveryWhere ID. This could be used by other organizations to access your [place] medical records  [id number]        Your Vitals Were     Pulse Last Period BMI (Body Mass Index)             99 08/30/2016 41.48 kg/m2          Blood Pressure from Last 3 Encounters:   05/23/17 120/74   05/19/17 124/70   05/12/17 110/80    Weight from Last 3 Encounters:   05/23/17 108.9 kg (240 lb)   05/19/17 108.4 kg (239 lb)   05/12/17 108 kg (238 lb)              Today, you had the following     No orders found for display       Primary Care Provider Office Phone # Fax #    Linda Tanna Ruffin [phone]       [facility] [address] [place] [address]        Thank you!     Thank you for choosing [facility]  for your care. Our goal is always to provide you with excellent care. Hearing back from our patients is one way we can continue to improve our services. Please take a few minutes to complete the written survey that you may receive in the mail after your visit with us. Thank you!             Your Updated Medication List - Protect others around you: Learn how to safely use, store and throw away your medicines at www.disposemymeds.org.          This list is accurate as of: 5/23/17  9:24 AM.  Always use your most recent med list.                   Brand Name Dispense Instructions for use    PRENATAL VITAMIN PO

## 2017-05-23 NOTE — NURSING NOTE
"Chief Complaint   Patient presents with     Prenatal Care     Pre-op for        Initial /74  Pulse 99  Wt 108.9 kg (240 lb)  LMP 2016  BMI 41.48 kg/m2 Estimated body mass index is 41.48 kg/(m^2) as calculated from the following:    Height as of 17: 1.62 m (5' 3.78\").    Weight as of this encounter: 108.9 kg (240 lb).  Medication Reconciliation: complete     38w0d    "

## 2017-05-23 NOTE — PROGRESS NOTES
26 year old  at 38w0d weeks presents to the clinic for a routine prenatal visit.  No concerns.  Complains of feeling more pressure.  No vaginal bleeding, leakage of fluid, or contractions   Fundal height=43cm  YUOq=541's  CX=deferrer per patient request  Discussed labor precautions  GBS=Negative  RCS next Thursday        Date of Surgery: 17  Type of Anticipated Surgery: [facility]  Surgeon: Violette Baltazar  Type of Anesthesia Anticipated: spinal                               SUBJECTIVE:  Scotty Garber is an 26 year old female here for Preoperative clearance for surgery from operating surgeon Dr. Baltazar.    HPI:  for repeat c section clearance    Any Aspirin within 10 days? No  Transfusion reactions: No prior transfusions  Bleeding tendencies:No bleeding problems noted    Patient Active Problem List   Diagnosis     CARDIOVASCULAR SCREENING; LDL GOAL LESS THAN 160     Attention deficit disorder of adult     Migraines     Need for Tdap vaccination     Declines  (vaginal birth after ) trial     History of postpartum depression     History of macrosomia in infant in prior pregnancy, currently pregnant     Past Medical History:   Diagnosis Date     Depression      History of macrosomia in infant in prior pregnancy, currently pregnant 2016     History of postpartum depression 2016      Current Outpatient Prescriptions   Medication     Prenatal Vit-Fe Sulfate-FA (PRENATAL VITAMIN OR)     No current facility-administered medications for this visit.      Allergies   Allergen Reactions     Nkda [No Known Drug Allergies]      Seasonal Allergies      Past Surgical History:   Procedure Laterality Date      SECTION  2014     reconstructive knee surgery  1/15/2010     Family History   Problem Relation Age of Onset     CANCER Mother      cervical cancer     Depression Mother      DIABETES Maternal Grandmother      Hypertension Maternal Grandmother      Depression Paternal  Grandmother      No family history of malignant hyperthermia.       REVIEW OF SYSTEMS:  General: negative  Skin: negative  Eyes: negative  Ears/Nose/Throat: negative  Respiratory: No shortness of breath, dyspnea on exertion, cough, or hemoptysis  Cardiovascular: negative  Gastrointestinal: negative  Genitourinary: negative  Musculoskeletal: negative  Neurologic: negative  Psychiatric: negative  Hematologic/Lymphatic/Immunologic: negative  Endocrine: negative       PHYSICAL EXAM:  General Appearance: healthy, alert and no distress  Head: Normocephalic. No masses, lesions, tenderness or abnormalities  Eyes: normal  Ears: negative  Nose: Nares normal  Mouth: Oropharynx normal  Heart:regular rate and rhythm and no murmurs, clicks, or gallops  Lungs: negative, Percussion normal. Good diaphragmatic excursion. Lungs clear  Abdomen: Abdomen soft, non-tender. BS normal. No masses, organomegaly, gravid  Genitals: Deferred  Extremities: Extremities normal. No deformities, edema, or skin discoloration.  Musculoskeletal: Spine ROM normal. Muscular strength intact.  Skin: Skin color, texture, turgor normal. No rashes or lesions.  Neurological: Gait normal. Reflexes normal and symmetric. Sensation grossly WNL.    Diabetic Instructions Given for Pre-Op Insulin: NOT APPLICABLE      ASSESSMENT:  Preoperative clearance for surgery.       PLAN:  Cleared for surgery: yes    Violette Baltazar

## 2017-06-12 ENCOUNTER — MYC MEDICAL ADVICE (OUTPATIENT)
Dept: OBGYN | Facility: OTHER | Age: 27
End: 2017-06-12

## 2017-06-12 ENCOUNTER — TELEPHONE (OUTPATIENT)
Dept: FAMILY MEDICINE | Facility: OTHER | Age: 27
End: 2017-06-12

## 2017-06-12 NOTE — TELEPHONE ENCOUNTER
Reason for call:  Patient reporting a symptom    Symptom or request: lethargic,bleeding, not drinking any water , nausea    Duration (how long have symptoms been present): since 10 am this morning    Have you been treated for this before? No    Additional comments: pt  calling states pt had baby June 1st and is experiencing nausea, lethargic, bleeding increased a little, not drinking water. Pt  wants to know if he should bring pt in.    Phone Number patient can be reached at:  [phone]    Best Time:  ANY    Can we leave a detailed message on this number:  YES    Call taken on 6/12/2017 at 3:27 PM by Stephani Ledesma

## 2017-06-12 NOTE — TELEPHONE ENCOUNTER
Spoke with : Pato  Baby born  vis   Home since   Mother in law is staying with them  Lethargic since 10 am, responding but not herself  99.4 ear now  Weak  Nauseated  bleeding did increase last night, but not filling a pad within an hour.     RECOMMENDED DISPOSITION:  To ED/UC for evaluation, another person to drive -   Will comply with recommendation: yes   If further questions/concerns or if Sx do not improve, worsen or new Sx develop, call your PCP or [facility] as soon as possible.    NOTES:  Disposition was determined by the first positive assessment question, therefore all previous assessment questions were negative.     Guideline used:postpartum care of incision from episotomy or  section  Telephone Triage for Obstetrics and Gynecology, Jessica Sifuentes and Dionna Hilliard RN

## 2017-06-13 ENCOUNTER — OFFICE VISIT (OUTPATIENT)
Dept: FAMILY MEDICINE | Facility: OTHER | Age: 27
End: 2017-06-13
Payer: COMMERCIAL

## 2017-06-13 VITALS
BODY MASS INDEX: 34.97 KG/M2 | SYSTOLIC BLOOD PRESSURE: 108 MMHG | TEMPERATURE: 97.7 F | RESPIRATION RATE: 16 BRPM | WEIGHT: 204.8 LBS | HEIGHT: 64 IN | DIASTOLIC BLOOD PRESSURE: 76 MMHG | HEART RATE: 72 BPM

## 2017-06-13 DIAGNOSIS — R19.7 DIARRHEA OF PRESUMED INFECTIOUS ORIGIN: Primary | ICD-10-CM

## 2017-06-13 LAB
C DIFF TOX B STL QL: NORMAL
SPECIMEN SOURCE: NORMAL

## 2017-06-13 PROCEDURE — 87493 C DIFF AMPLIFIED PROBE: CPT | Performed by: STUDENT IN AN ORGANIZED HEALTH CARE EDUCATION/TRAINING PROGRAM

## 2017-06-13 PROCEDURE — 99213 OFFICE O/P EST LOW 20 MIN: CPT | Performed by: STUDENT IN AN ORGANIZED HEALTH CARE EDUCATION/TRAINING PROGRAM

## 2017-06-13 ASSESSMENT — PAIN SCALES - GENERAL: PAINLEVEL: NO PAIN (0)

## 2017-06-13 NOTE — NURSING NOTE
"Chief Complaint   Patient presents with     ER F/U       Initial /76 (BP Location: Right arm, Patient Position: Chair, Cuff Size: Adult Regular)  Pulse 72  Temp 97.7  F (36.5  C) (Oral)  Resp 16  Ht 5' 3.78\" (1.62 m)  Wt 204 lb 12.8 oz (92.9 kg)  LMP 08/30/2016  BMI 35.4 kg/m2 Estimated body mass index is 35.4 kg/(m^2) as calculated from the following:    Height as of this encounter: 5' 3.78\" (1.62 m).    Weight as of this encounter: 204 lb 12.8 oz (92.9 kg).  Medication Reconciliation: complete   Lurdes Magana CMA      "

## 2017-06-13 NOTE — TELEPHONE ENCOUNTER
Patient saw Alva Petit this morning and will continue Keflex. Closing encounter at this time.     Marli Berman, RN, BSN

## 2017-06-13 NOTE — PROGRESS NOTES
"  SUBJECTIVE:                                                    Scotty Garber is a 26 year old female who presents to clinic today for the following health issues:      HPI    ED/UC Followup:    Facility:  [facility]  Date of visit: 06/12/2017  Reason for visit: Diarrhea, light headed  Current Status: Feels the same. Diarrhea isn't as bad, but still feels lightheaded, dizzy, weak, vomited this morning 3 times. Able to keep apple sauce down.    Diarrhea - yesterday morning, yellowish, gurgly, 2 pm was worse, felt dizzy and like could pass out, turned white as a ghost    Problem list and histories reviewed & adjusted, as indicated.  Additional history: as documented    Labs reviewed in EPIC    ROS:  Constitutional, HEENT, cardiovascular, pulmonary, gi and gu systems are negative, except as otherwise noted.    OBJECTIVE:                                                    /76 (BP Location: Right arm, Patient Position: Chair, Cuff Size: Adult Regular)  Pulse 72  Temp 97.7  F (36.5  C) (Oral)  Resp 16  Ht 5' 3.78\" (1.62 m)  Wt 204 lb 12.8 oz (92.9 kg)  LMP 08/30/2016  BMI 35.4 kg/m2  Body mass index is 35.4 kg/(m^2).  GENERAL: healthy, alert and no distress  NECK: no adenopathy, no asymmetry, masses, or scars  RESP: lungs clear to auscultation - no rales, rhonchi or wheezes  CV: regular rate and rhythm, normal S1 S2, no S3 or S4, no murmur, click or rub, no peripheral edema   ABDOMEN: soft, nontender, no hepatosplenomegaly, no masses and bowel sounds normal  MS: no gross musculoskeletal defects noted, no edema  SKIN: no suspicious lesions or rashes  NEURO: Normal strength and tone, mentation intact and speech normal  PSYCH: mentation appears normal, affect normal/bright    Diagnostic Test Results:  Results for orders placed or performed in visit on 06/13/17   Clostridium difficile Toxin B PCR   Result Value Ref Range    Specimen Description Feces     C Diff Toxin B PCR  NEG     " Negative  Negative: Clostridium difficile target DNA sequences NOT detected, presumed   negative for Clostridium difficile toxin B or the number of bacteria present   may be below the limit of detection for the test.   FDA approved assay performed using [employer] GeneXpert real-time PCR.   A negative result does not exclude actual disease due to Clostridium difficile   and may be due to improper collection, handling and storage of the specimen or   the number of organisms in the specimen is below the detection limit of the   assay.          ASSESSMENT/PLAN:                                                      1. Diarrhea of presumed infectious origin  Patient was seen in the ER at [facility] on 17 for generalized weakness, diarrhea, and nausea.  She was diagnosis with a UTI and given Keflex.  She continues to have diarrhea that is yellowish and bubbly with a distinct odor. She does not have recent antibiotic use up until the UTI diagnosis yesterday but there is some concern for C-Diff as she was recently hospitalized for .  Will check c-diff PCR.  In the meantime, discussed staying hydrated by taking small sips of fluid every 10 minutes.  She may eat a bland diet as tolerated.  If C-diff is negative I anticipate her symptoms will resolve with time and conservative treatment.   - Clostridium difficile Toxin B PCR; Future  - Clostridium difficile Toxin B PCR    JEFF Levi [facility]

## 2017-06-13 NOTE — MR AVS SNAPSHOT
After Visit Summary   6/13/2017    Scotty Garber    MRN: [id number]           Patient Information     Date Of Birth          1990        Visit Information        Provider Department      6/13/2017 10:40 AM Va Petit APRN CNP [facility]        Today's Diagnoses     Diarrhea of presumed infectious origin    -  1       Follow-ups after your visit        Your next 10 appointments already scheduled     Jul 10, 2017 10:00 AM CDT   [facility] Visit with Violette Baltazar DO   [facility]    [address] [facility] [id number] [phone]           Bring a current list of meds and any records pertaining to this visit.  For Physicals, please bring immunization records and any forms needing to be filled out.  Please arrive 10 minutes early to complete paperwork.              Who to contact     If you have questions or need follow up information about today's clinic visit or your schedule please contact [facility] directly at [phone].  Normal or non-critical lab and imaging results will be communicated to you by Arachn[employer], letter or phone within 4 business days after the clinic has received the results. If you do not hear from us within 7 days, please contact the clinic through [employer] or phone. If you have a critical or abnormal lab result, we will notify you by phone as soon as possible.  Submit refill requests through [employer] or call your pharmacy and they will forward the refill request to us. Please allow 3 business days for your refill to be completed.          Additional Information About Your Visit        MyChart Information     [employer] gives you secure access to your electronic health record. If you see a primary care provider, you can also send messages to your care team and make appointments. If you have questions, please call your primary care clinic.  If you do not have a primary care  "provider, please call [phone] and they will assist you.        Care EveryWhere ID     This is your Care EveryWhere ID. This could be used by other organizations to access your [place] medical records  [id number]        Your Vitals Were     Pulse Temperature Respirations Height Last Period BMI (Body Mass Index)    72 97.7  F (36.5  C) (Oral) 16 5' 3.78\" (1.62 m) 08/30/2016 35.4 kg/m2       Blood Pressure from Last 3 Encounters:   06/13/17 108/76   05/23/17 120/74   05/19/17 124/70    Weight from Last 3 Encounters:   06/13/17 204 lb 12.8 oz (92.9 kg)   05/23/17 240 lb (108.9 kg)   05/19/17 239 lb (108.4 kg)              We Performed the Following     Clostridium difficile Toxin B PCR        Primary Care Provider Office Phone # Fax #    Linda Tanna Ruffin [phone]       [facility] [address] [place] [address]        Equal Access to Services     [facility]: Hadii aad ku hadasho Soomaali, waaxda luqadaha, qaybta kaalmada adeegsagar, latha astudillo . So [facility] [phone].    ATENCIÓN: Si habla español, tiene a nixon disposición servicios gratuitos de asistencia lingüística. Llame al [phone].    We comply with applicable federal civil rights laws and [place] laws. We do not discriminate on the basis of race, color, national origin, age, disability sex, sexual orientation or gender identity.            Thank you!     Thank you for choosing [facility]  for your care. Our goal is always to provide you with excellent care. Hearing back from our patients is one way we can continue to improve our services. Please take a few minutes to complete the written survey that you may receive in the mail after your visit with us. Thank you!             Your Updated Medication List - Protect others around you: Learn how to safely use, store and throw away your medicines at www.disposemymeds.org.          This list is accurate as of: 6/13/17 " 11:59 PM.  Always use your most recent med list.                   Brand Name Dispense Instructions for use Diagnosis    PRENATAL VITAMIN PO

## 2017-06-13 NOTE — TELEPHONE ENCOUNTER
Please call patient and see how is she is doing from her ED visit.  She states she is vomiting now.  Let her know the Keflex is ok to take while breast feeding.    Violette Baltazar

## 2017-07-10 ENCOUNTER — OFFICE VISIT (OUTPATIENT)
Dept: OBGYN | Facility: OTHER | Age: 27
End: 2017-07-10
Payer: COMMERCIAL

## 2017-07-10 VITALS — DIASTOLIC BLOOD PRESSURE: 70 MMHG | SYSTOLIC BLOOD PRESSURE: 96 MMHG | HEART RATE: 72 BPM

## 2017-07-10 PROCEDURE — 99207 ZZC POST PARTUM EXAM: CPT | Performed by: OBSTETRICS & GYNECOLOGY

## 2017-07-10 ASSESSMENT — ANXIETY QUESTIONNAIRES
6. BECOMING EASILY ANNOYED OR IRRITABLE: MORE THAN HALF THE DAYS
GAD7 TOTAL SCORE: 7
2. NOT BEING ABLE TO STOP OR CONTROL WORRYING: MORE THAN HALF THE DAYS
3. WORRYING TOO MUCH ABOUT DIFFERENT THINGS: SEVERAL DAYS
5. BEING SO RESTLESS THAT IT IS HARD TO SIT STILL: NOT AT ALL
7. FEELING AFRAID AS IF SOMETHING AWFUL MIGHT HAPPEN: NOT AT ALL
1. FEELING NERVOUS, ANXIOUS, OR ON EDGE: NOT AT ALL

## 2017-07-10 ASSESSMENT — PATIENT HEALTH QUESTIONNAIRE - PHQ9: 5. POOR APPETITE OR OVEREATING: MORE THAN HALF THE DAYS

## 2017-07-10 ASSESSMENT — PAIN SCALES - GENERAL: PAINLEVEL: NO PAIN (0)

## 2017-07-10 NOTE — NURSING NOTE
"Chief Complaint   Patient presents with     Post Partum Exam     PHQ9 --  Pap is Due       Initial There were no vitals taken for this visit. Estimated body mass index is 35.4 kg/(m^2) as calculated from the following:    Height as of 6/13/17: 5' 3.78\" (1.62 m).    Weight as of 6/13/17: 204 lb 12.8 oz (92.9 kg).  Medication Reconciliation: complete       6 wk pp  "

## 2017-07-10 NOTE — PROGRESS NOTES
Subjective  26 year old non-pregnant female presents today for a postpartum visit.  Patient had a RCS on 17.  No pain.  No vaginal bleeding.  Patient had a UTI post partum at one week.  She states she was very dehydrated.  No fever or chills now.  No problems urinating.  Normal bowel movements.  Patient is breast feeding.  No signs and symptoms of ppd.  Patient scored a 3 on the PHQ-9 and a 7 on the WHITNEY-7.  No thoughts of suicide or infanticide.  Patient is due for a pap smear today.  Patient is wanting to do the Mirena.  She will schedule this in a few weeks.           ROS: 10 point ROS neg other than the symptoms noted above in the HPI.  Past Medical History:   Diagnosis Date     Depression      History of macrosomia in infant in prior pregnancy, currently pregnant 2016     History of postpartum depression 2016     Past Surgical History:   Procedure Laterality Date      SECTION  2014     reconstructive knee surgery  1/15/2010     Family History   Problem Relation Age of Onset     CANCER Mother      cervical cancer     Depression Mother      DIABETES Maternal Grandmother      Hypertension Maternal Grandmother      Depression Paternal Grandmother      Social History   Substance Use Topics     Smoking status: Never Smoker     Smokeless tobacco: Never Used     Alcohol use Yes      Comment: occasion         Objective  Vitals: There were no vitals taken for this visit.  BMI= There is no height or weight on file to calculate BMI.        Assessment  1.)  Post partum visit  2.)  S/p RCS on   3.)  Birth control   4.)  Due for pap smear       Plan  1.)  Schedule Mirena insertion  2.)  Pap smear at time of Mirena      Violette Baltazar

## 2017-07-10 NOTE — PATIENT INSTRUCTIONS
Please call if you any questions.    [address]   [address]  [phone]        Violette Baltazar

## 2017-07-10 NOTE — NURSING NOTE
"Chief Complaint   Patient presents with     Post Partum Exam     PHQ9 --  Pap is Due       Initial BP 96/70  Pulse 72 Estimated body mass index is 35.4 kg/(m^2) as calculated from the following:    Height as of 6/13/17: 5' 3.78\" (1.62 m).    Weight as of 6/13/17: 204 lb 12.8 oz (92.9 kg).  Medication Reconciliation: complete       6 wk PP  "

## 2017-07-10 NOTE — MR AVS SNAPSHOT
After Visit Summary   7/10/2017    Scotty Garber    MRN: [id number]           Patient Information     Date Of Birth          1990        Visit Information        Provider Department      7/10/2017 10:00 AM Violette Baltazar, DO [facility]        Today's Diagnoses     Routine postpartum follow-up    -  1      Care Instructions    Please call if you any questions.    [facility]  [address] [place]   [address]  [phone]        Violette Baltazar            Follow-ups after your visit        Follow-up notes from your care team     Return in about 4 weeks (around 8/7/2017).      Who to contact     If you have questions or need follow up information about today's clinic visit or your schedule please contact [facility] directly at [phone].  Normal or non-critical lab and imaging results will be communicated to you by [employer], letter or phone within 4 business days after the clinic has received the results. If you do not hear from us within 7 days, please contact the clinic through MyChart or phone. If you have a critical or abnormal lab result, we will notify you by phone as soon as possible.  Submit refill requests through [email or web address] or call your pharmacy and they will forward the refill request to us. Please allow 3 business days for your refill to be completed.          Additional Information About Your Visit        MyChart Information     [email or web address] gives you secure access to your electronic health record. If you see a primary care provider, you can also send messages to your care team and make appointments. If you have questions, please call your primary care clinic.  If you do not have a primary care provider, please call [phone] and they will assist you.        Care EveryWhere ID     This is your Care EveryWhere ID. This could be used by other organizations to access your [place] medical records  [id number]        Your Vitals  Were     Pulse                   72            Blood Pressure from Last 3 Encounters:   07/10/17 96/70   06/13/17 108/76   05/23/17 120/74    Weight from Last 3 Encounters:   06/13/17 204 lb 12.8 oz (92.9 kg)   05/23/17 240 lb (108.9 kg)   05/19/17 239 lb (108.4 kg)              Today, you had the following     No orders found for display       Primary Care Provider Office Phone # Fax #    Linda Tanna Ruffin [phone]       [facility] [address] [place] [address]        Equal Access to Services     [facility]: Hadii spring robledo hadasho Sochauncey, waaxda luqadaha, qaybta kaalmada adecata, latha astudillo . So [facility] [phone].    ATENCIÓN: Si habla español, tiene a nixon disposición servicios gratuitos de asistencia lingüística. Llame al [phone].    We comply with applicable federal civil rights laws and [place] laws. We do not discriminate on the basis of race, color, national origin, age, disability sex, sexual orientation or gender identity.            Thank you!     Thank you for choosing [facility]  for your care. Our goal is always to provide you with excellent care. Hearing back from our patients is one way we can continue to improve our services. Please take a few minutes to complete the written survey that you may receive in the mail after your visit with us. Thank you!             Your Updated Medication List - Protect others around you: Learn how to safely use, store and throw away your medicines at www.disposemymeds.org.          This list is accurate as of: 7/10/17 11:12 AM.  Always use your most recent med list.                   Brand Name Dispense Instructions for use Diagnosis    PRENATAL VITAMIN PO

## 2017-07-11 ASSESSMENT — ANXIETY QUESTIONNAIRES: GAD7 TOTAL SCORE: 7

## 2017-07-11 ASSESSMENT — PATIENT HEALTH QUESTIONNAIRE - PHQ9: SUM OF ALL RESPONSES TO PHQ QUESTIONS 1-9: 3

## 2017-08-17 ENCOUNTER — TELEPHONE (OUTPATIENT)
Dept: FAMILY MEDICINE | Facility: OTHER | Age: 27
End: 2017-08-17

## 2017-08-17 NOTE — TELEPHONE ENCOUNTER
Summary:    Patient is due/failing the following:   PAP    Action needed:   Patient needs office visit for PAP.    Type of outreach:    Phone, spoke to patient.  Patient declines     Questions for provider review:    None                                                                                                                                    Leola Weinstein       Chart routed to Care Team .      Panel Management Review      Patient has the following on her problem list: None      Composite cancer screening  Chart review shows that this patient is due/due soon for the following Pap Smear

## 2017-10-01 ENCOUNTER — HEALTH MAINTENANCE LETTER (OUTPATIENT)
Age: 27
End: 2017-10-01

## 2017-10-02 ENCOUNTER — ALLIED HEALTH/NURSE VISIT (OUTPATIENT)
Dept: FAMILY MEDICINE | Facility: OTHER | Age: 27
End: 2017-10-02
Payer: COMMERCIAL

## 2017-10-02 DIAGNOSIS — Z23 NEED FOR PROPHYLACTIC VACCINATION AND INOCULATION AGAINST INFLUENZA: Primary | ICD-10-CM

## 2017-10-02 PROCEDURE — 90686 IIV4 VACC NO PRSV 0.5 ML IM: CPT

## 2017-10-02 PROCEDURE — 99207 ZZC NO CHARGE NURSE ONLY: CPT

## 2017-10-02 PROCEDURE — 90471 IMMUNIZATION ADMIN: CPT

## 2017-10-02 NOTE — PROGRESS NOTES
Injectable Influenza Immunization Documentation    1.  Is the person to be vaccinated sick today?   No    2. Does the person to be vaccinated have an allergy to a component   of the vaccine?   No    3. Has the person to be vaccinated ever had a serious reaction   to influenza vaccine in the past?   No    4. Has the person to be vaccinated ever had Guillain-Barré syndrome?   No    Form completed by Scotty Garber    Prior to injection verified patient identity using patient's name and date of birth.  Patient instructed to remain in clinic for 15 minutes afterwards, and to report any adverse reaction to me immediately.    Lurdes Magana, CMA

## 2017-10-02 NOTE — MR AVS SNAPSHOT
After Visit Summary   10/2/2017    Scotty Garber    MRN: [id number]           Patient Information     Date Of Birth          1990        Visit Information        Provider Department      10/2/2017 11:15 AM NL FLU SHOT ERC [facility]        Today's Diagnoses     Need for prophylactic vaccination and inoculation against influenza    -  1       Follow-ups after your visit        Your next 10 appointments already scheduled     Oct 02, 2017 11:15 AM CDT   Nurse Only with NL FLU SHOT ERC   [facility]    [address]  [facility] [id number] [phone]              Who to contact     If you have questions or need follow up information about today's clinic visit or your schedule please contact [facility] directly at [phone].  Normal or non-critical lab and imaging results will be communicated to you by Hortonworkshart, letter or phone within 4 business days after the clinic has received the results. If you do not hear from us within 7 days, please contact the clinic through Hortonworkshart or phone. If you have a critical or abnormal lab result, we will notify you by phone as soon as possible.  Submit refill requests through Pathflow or call your pharmacy and they will forward the refill request to us. Please allow 3 business days for your refill to be completed.          Additional Information About Your Visit        MyChart Information     Pathflow gives you secure access to your electronic health record. If you see a primary care provider, you can also send messages to your care team and make appointments. If you have questions, please call your primary care clinic.  If you do not have a primary care provider, please call [phone] and they will assist you.        Care EveryWhere ID     This is your Care EveryWhere ID. This could be used by other organizations to access your [facility] medical records  [id number]          Blood Pressure from Last 3 Encounters:   07/10/17 96/70   06/13/17 108/76   05/23/17 120/74    Weight from Last 3 Encounters:   06/13/17 204 lb 12.8 oz (92.9 kg)   05/23/17 240 lb (108.9 kg)   05/19/17 239 lb (108.4 kg)              We Performed the Following     FLU VAC, SPLIT VIRUS IM > 3 YO (QUADRIVALENT) [77488]     Vaccine Administration, Initial [74675]        Primary Care Provider Office Phone # Fax #    Linda Tanna Ruffin [phone]       XXX RESIGNED XXX [address] [place] [address]        Equal Access to Services     [facility] MARINA : Irma Hutchinson, walindsay fulton, qaybta kaalmada porfirio, latha astudillo . So [facility] [phone].    ATENCIÓN: Si habla español, tiene a nixon disposición servicios gratuitos de asistencia lingüística. Llame al [phone].    We comply with applicable federal civil rights laws and [place] laws. We do not discriminate on the basis of race, color, national origin, age, disability, sex, sexual orientation, or gender identity.            Thank you!     Thank you for choosing [facility]  for your care. Our goal is always to provide you with excellent care. Hearing back from our patients is one way we can continue to improve our services. Please take a few minutes to complete the written survey that you may receive in the mail after your visit with us. Thank you!             Your Updated Medication List - Protect others around you: Learn how to safely use, store and throw away your medicines at www.disposemymeds.org.          This list is accurate as of: 10/2/17 10:48 AM.  Always use your most recent med list.                   Brand Name Dispense Instructions for use Diagnosis    PRENATAL VITAMIN PO

## 2018-01-16 ENCOUNTER — MYC MEDICAL ADVICE (OUTPATIENT)
Dept: FAMILY MEDICINE | Facility: OTHER | Age: 28
End: 2018-01-16

## 2018-01-16 ENCOUNTER — TELEPHONE (OUTPATIENT)
Dept: FAMILY MEDICINE | Facility: OTHER | Age: 28
End: 2018-01-16

## 2018-01-16 NOTE — TELEPHONE ENCOUNTER
Patient has questions about her breast milk and breast feeding.  Reason for Call:  Medication or medication refill:    Do you use a [facility]?  Name of the pharmacy and phone number for the current request:  [facility]    Name of the medication requested: breast milk booster    Other request: call to discuss breast milk boosting rx today please.    Can we leave a detailed message on this number? YES    Phone number patient can be reached at: Home number on file [phone] (home)    Best Time: any    Call taken on 1/16/2018 at 3:07 PM by Ute Wilson

## 2018-06-18 ENCOUNTER — ALLIED HEALTH/NURSE VISIT (OUTPATIENT)
Dept: FAMILY MEDICINE | Facility: OTHER | Age: 28
End: 2018-06-18
Payer: COMMERCIAL

## 2018-06-18 VITALS — BODY MASS INDEX: 37.33 KG/M2 | DIASTOLIC BLOOD PRESSURE: 80 MMHG | SYSTOLIC BLOOD PRESSURE: 132 MMHG | WEIGHT: 216 LBS

## 2018-06-18 DIAGNOSIS — Z32.01 PREGNANCY TEST POSITIVE: ICD-10-CM

## 2018-06-18 DIAGNOSIS — Z32.00 POSSIBLE PREGNANCY, NOT YET CONFIRMED: Primary | ICD-10-CM

## 2018-06-18 LAB
B-HCG SERPL-ACNC: 1114 IU/L (ref 0–5)
BETA HCG QUAL IFA URINE: POSITIVE

## 2018-06-18 PROCEDURE — 36415 COLL VENOUS BLD VENIPUNCTURE: CPT | Performed by: ADVANCED PRACTICE MIDWIFE

## 2018-06-18 PROCEDURE — 84702 CHORIONIC GONADOTROPIN TEST: CPT | Performed by: ADVANCED PRACTICE MIDWIFE

## 2018-06-18 PROCEDURE — 84703 CHORIONIC GONADOTROPIN ASSAY: CPT | Performed by: ADVANCED PRACTICE MIDWIFE

## 2018-06-18 ASSESSMENT — PAIN SCALES - GENERAL: PAINLEVEL: NO PAIN (0)

## 2018-06-18 NOTE — MR AVS SNAPSHOT
After Visit Summary   6/18/2018    Scotty Garber    MRN: [id number]           Patient Information     Date Of Birth          1990        Visit Information        Provider Department      6/18/2018 3:00 PM NL RN TEAM A, [facility]        Today's Diagnoses     Possible pregnancy, not yet confirmed    -  1    Pregnancy test positive           Follow-ups after your visit        Your next 10 appointments already scheduled     Aug 07, 2018  3:30 PM CDT   New Prenatal with JEFF Hall CNM   [facility]    [address] [facility] [id number] [phone]              Who to contact     If you have questions or need follow up information about today's clinic visit or your schedule please contact [facility] directly at [phone].  Normal or non-critical lab and imaging results will be communicated to you by MyChart, letter or phone within 4 business days after the clinic has received the results. If you do not hear from us within 7 days, please contact the clinic through MyChart or phone. If you have a critical or abnormal lab result, we will notify you by phone as soon as possible.  Submit refill requests through Nogacom or call your pharmacy and they will forward the refill request to us. Please allow 3 business days for your refill to be completed.          Additional Information About Your Visit        MyChart Information     Nog[employer]om gives you secure access to your electronic health record. If you see a primary care provider, you can also send messages to your care team and make appointments. If you have questions, please call your primary care clinic.  If you do not have a primary care provider, please call [phone] and they will assist you.        Care EveryWhere ID     This is your Care EveryWhere ID. This could be used by other organizations to access your [facility]  records  [id number]        Your Vitals Were     Last Period Breastfeeding? BMI (Body Mass Index)             05/12/2018 (Exact Date) No 37.33 kg/m2          Blood Pressure from Last 3 Encounters:   06/18/18 132/80   07/10/17 96/70   06/13/17 108/76    Weight from Last 3 Encounters:   06/18/18 216 lb (98 kg)   06/13/17 204 lb 12.8 oz (92.9 kg)   05/23/17 240 lb (108.9 kg)              We Performed the Following     Beta HCG qual IFA urine     HCG quantitative pregnancy        Primary Care Provider Office Phone # Fax #    [facility] [phone]       [address] [address]        Equal Access to Services     SHARONDA GRAY : Irma brantleyo Jasper, waaxda luqadaha, qaybta kaalmada adeegyaarnold, latha marino. So [facility] [phone].    ATENCIÓN: Si habla español, tiene a nixon disposición servicios gratuitos de asistencia lingüística. [facility] [phone].    We comply with applicable federal civil rights laws and [place] laws. We do not discriminate on the basis of race, color, national origin, age, disability, sex, sexual orientation, or gender identity.            Thank you!     Thank you for choosing [facility]  for your care. Our goal is always to provide you with excellent care. Hearing back from our patients is one way we can continue to improve our services. Please take a few minutes to complete the written survey that you may receive in the mail after your visit with us. Thank you!             Your Updated Medication List - Protect others around you: Learn how to safely use, store and throw away your medicines at www.disposemymeds.org.          This list is accurate as of 6/18/18  3:19 PM.  Always use your most recent med list.                   Brand Name Dispense Instructions for use Diagnosis    PRENATAL VITAMIN PO

## 2018-06-18 NOTE — PROGRESS NOTES
Scotty Garber is a 27 year old here today for a pregnancy test.  LMP: Patient's last menstrual period was 2018 (exact date).  Wt: 216 lbs 0 oz.    Symptoms include breast tenderness, absence of menses, nausea, fatigue and heart burn.    Scotty informed of positive pregnancy test results. TRISTON: 19    Educational advice given: nutrition, smoking and drugs & alcohol.    Current medications reviewed: Yes    Previous pregnancy history remarkable for: none.    Plan: follow-up appointment with Maria Victoria Lezama CNM for pre- care, take multivitamin or pre- vitamins and OB Education packet given.    She is to call back if she has any questions or concerns.  She is advised to notify a provider immediately if she experiences any severe cramping or abdominal pain or any vaginal bleeding.    Haylie Oviedo RN

## 2018-07-06 ENCOUNTER — RADIANT APPOINTMENT (OUTPATIENT)
Dept: ULTRASOUND IMAGING | Facility: OTHER | Age: 28
End: 2018-07-06
Attending: ADVANCED PRACTICE MIDWIFE
Payer: COMMERCIAL

## 2018-07-06 DIAGNOSIS — Z32.01 PREGNANCY TEST POSITIVE: ICD-10-CM

## 2018-07-06 PROCEDURE — 76801 OB US < 14 WKS SINGLE FETUS: CPT

## 2018-07-30 ENCOUNTER — MYC MEDICAL ADVICE (OUTPATIENT)
Dept: OBGYN | Facility: OTHER | Age: 28
End: 2018-07-30

## 2018-07-30 NOTE — TELEPHONE ENCOUNTER
Responded via MyChart using headache protocol AND medications for common pregnancy discomforts clinical reference. Marce Conner RN, BSN

## 2018-08-01 NOTE — TELEPHONE ENCOUNTER
LM for the patient to return call to the clinic to discuss the below. Will await to hear from patient. Marce Conner RN, BSN

## 2018-08-02 NOTE — TELEPHONE ENCOUNTER
Ovonyx message read 8/1/2018  8:41 AM by Scotty Garber. No response at this time. .  Next 5 appointments (look out 90 days)     Aug 07, 2018  3:30 PM CDT   [facility]atal [facility]   [facility]    [address] [facility] [address]   [phone]              Will close encounter at this time. Marce Conner, RN, BSN

## 2018-08-07 ENCOUNTER — PRENATAL OFFICE VISIT (OUTPATIENT)
Dept: OBGYN | Facility: OTHER | Age: 28
End: 2018-08-07
Payer: COMMERCIAL

## 2018-08-07 ENCOUNTER — MYC MEDICAL ADVICE (OUTPATIENT)
Dept: OBGYN | Facility: OTHER | Age: 28
End: 2018-08-07

## 2018-08-07 VITALS
SYSTOLIC BLOOD PRESSURE: 102 MMHG | BODY MASS INDEX: 38.23 KG/M2 | WEIGHT: 215.75 LBS | HEIGHT: 63 IN | DIASTOLIC BLOOD PRESSURE: 66 MMHG | HEART RATE: 88 BPM

## 2018-08-07 DIAGNOSIS — Z23 NEED FOR TDAP VACCINATION: ICD-10-CM

## 2018-08-07 DIAGNOSIS — Z98.891 HISTORY OF 2 CESAREAN SECTIONS: ICD-10-CM

## 2018-08-07 DIAGNOSIS — Z34.80 SUPERVISION OF OTHER NORMAL PREGNANCY, ANTEPARTUM: Primary | ICD-10-CM

## 2018-08-07 LAB
ABO + RH BLD: NORMAL
ABO + RH BLD: NORMAL
ALBUMIN UR-MCNC: NEGATIVE MG/DL
APPEARANCE UR: CLEAR
BILIRUB UR QL STRIP: NEGATIVE
BLD GP AB SCN SERPL QL: NORMAL
BLOOD BANK CMNT PATIENT-IMP: NORMAL
COLOR UR AUTO: YELLOW
ERYTHROCYTE [DISTWIDTH] IN BLOOD BY AUTOMATED COUNT: 13.1 % (ref 10–15)
GLUCOSE UR STRIP-MCNC: NEGATIVE MG/DL
HCT VFR BLD AUTO: 40.4 % (ref 35–47)
HGB BLD-MCNC: 13.6 G/DL (ref 11.7–15.7)
HGB UR QL STRIP: NEGATIVE
KETONES UR STRIP-MCNC: NEGATIVE MG/DL
LEUKOCYTE ESTERASE UR QL STRIP: NEGATIVE
MCH RBC QN AUTO: 29.5 PG (ref 26.5–33)
MCHC RBC AUTO-ENTMCNC: 33.7 G/DL (ref 31.5–36.5)
MCV RBC AUTO: 88 FL (ref 78–100)
NITRATE UR QL: NEGATIVE
PH UR STRIP: 6.5 PH (ref 5–7)
PLATELET # BLD AUTO: 312 10E9/L (ref 150–450)
RBC # BLD AUTO: 4.61 10E12/L (ref 3.8–5.2)
SOURCE: NORMAL
SP GR UR STRIP: 1.01 (ref 1–1.03)
SPECIMEN EXP DATE BLD: NORMAL
UROBILINOGEN UR STRIP-ACNC: 0.2 EU/DL (ref 0.2–1)
WBC # BLD AUTO: 10.9 10E9/L (ref 4–11)

## 2018-08-07 PROCEDURE — 87086 URINE CULTURE/COLONY COUNT: CPT | Performed by: ADVANCED PRACTICE MIDWIFE

## 2018-08-07 PROCEDURE — 86901 BLOOD TYPING SEROLOGIC RH(D): CPT | Performed by: ADVANCED PRACTICE MIDWIFE

## 2018-08-07 PROCEDURE — 87340 HEPATITIS B SURFACE AG IA: CPT | Performed by: ADVANCED PRACTICE MIDWIFE

## 2018-08-07 PROCEDURE — 36415 COLL VENOUS BLD VENIPUNCTURE: CPT | Performed by: ADVANCED PRACTICE MIDWIFE

## 2018-08-07 PROCEDURE — 85027 COMPLETE CBC AUTOMATED: CPT | Performed by: ADVANCED PRACTICE MIDWIFE

## 2018-08-07 PROCEDURE — 86780 TREPONEMA PALLIDUM: CPT | Performed by: ADVANCED PRACTICE MIDWIFE

## 2018-08-07 PROCEDURE — 87389 HIV-1 AG W/HIV-1&-2 AB AG IA: CPT | Performed by: ADVANCED PRACTICE MIDWIFE

## 2018-08-07 PROCEDURE — 81003 URINALYSIS AUTO W/O SCOPE: CPT | Performed by: ADVANCED PRACTICE MIDWIFE

## 2018-08-07 PROCEDURE — 87624 HPV HI-RISK TYP POOLED RSLT: CPT | Performed by: ADVANCED PRACTICE MIDWIFE

## 2018-08-07 PROCEDURE — 86762 RUBELLA ANTIBODY: CPT | Performed by: ADVANCED PRACTICE MIDWIFE

## 2018-08-07 PROCEDURE — G0145 SCR C/V CYTO,THINLAYER,RESCR: HCPCS | Performed by: ADVANCED PRACTICE MIDWIFE

## 2018-08-07 PROCEDURE — 99207 ZZC FIRST OB VISIT: CPT | Performed by: ADVANCED PRACTICE MIDWIFE

## 2018-08-07 PROCEDURE — 86850 RBC ANTIBODY SCREEN: CPT | Performed by: ADVANCED PRACTICE MIDWIFE

## 2018-08-07 PROCEDURE — 86900 BLOOD TYPING SEROLOGIC ABO: CPT | Performed by: ADVANCED PRACTICE MIDWIFE

## 2018-08-07 NOTE — PROGRESS NOTES
Scotty Garber is a 27 year old  who presents to the clinic for an new ob visit.   Estimated Date of Delivery: 2019 is calculated from Patient's last menstrual period was 2018 (exact date).       She has not had bleeding since her LMP.   She has had nausea resulting in her feeling miserable but weight loss is one lb  HISTORY  updated and reviewed  Past Medical History:   Diagnosis Date     Depression      History of macrosomia in infant in prior pregnancy, currently pregnant 2016     History of postpartum depression 2016     Past Surgical History:   Procedure Laterality Date      SECTION  2014     reconstructive knee surgery  1/15/2010     Social History     Social History     Marital status:      Spouse name: N/A     Number of children: 1     Years of education: N/A     Occupational History      Student     Social History Main Topics     Smoking status: Never Smoker     Smokeless tobacco: Never Used     Alcohol use No      Comment: occasion     Drug use: No     Sexual activity: Yes     Partners: Male     Birth control/ protection: None     Other Topics Concern     Not on file     Social History Narrative     Health Maintenance   Topic Date Due     PAP Q3 YR  2017     PHQ-2 Q1 YR  07/10/2018     INFLUENZA VACCINE (1) 2018     TETANUS IMMUNIZATION (SYSTEM ASSIGNED)  2027     HIV SCREEN (SYSTEM ASSIGNED)  Completed     Family History   Problem Relation Age of Onset     Cancer Mother      cervical cancer     Depression Mother      Diabetes Maternal Grandmother      Hypertension Maternal Grandmother      Depression Paternal Grandmother            REVIEW OF SYSTEMS  CONSTITUTIONAL: NEGATIVE for fever, chills  INTEGUMENTARY/SKIN: NEGATIVE for worrisome rashes, moles or lesions  EYES: NEGATIVE for vision changes   ENT/MOUTH: NEGATIVE for ear, mouth and throat problems  RESP: NEGATIVE for significant cough or SOB  BREAST: NEGATIVE for masses, tenderness or  "discharge  CV: NEGATIVE for chest pain, palpitations   GI: NEGATIVE for nausea, abdominal pain, heartburn, or change in bowel habits  : NEGATIVE for frequency, dysuria, or hematuria  MUSCULOSKELETAL: NEGATIVE for significant arthralgias or myalgia  NEURO: NEGATIVE for weakness, dizziness or paresthesias or headache  ENDOCRINE: NEGATIVE for temperature intolerance, skin/hair changes  HEME: NEGATIVE for bleeding problems  PSYCHIATRIC: NEGATIVE for changes in mood or affect        PHYSICAL EXAM  /66 (BP Location: Left arm, Patient Position: Sitting, Cuff Size: Adult Large)  Pulse 88  Ht 5' 3.25\" (1.607 m)  Wt 215 lb 12 oz (97.9 kg)  LMP 2018 (Exact Date)  BMI 37.92 kg/m2  GENERAL:  Pleasant pregnant female, alert, cooperative  and well groomed.  SKIN:  Warm and dry, without lesions or rashes  HEAD: Symmetrical features.  EYES:  PERRLA.  EARS: Tympanic membranes gray, translucent and intact.  MOUTH:  Buccal mucosa pink, moist without lesions.  Teeth in good repair.    NECK:  Thyroid without enlargement and nodules.  Lymph nodes not palpable.   LUNGS:  Clear to auscultation.  BREAST:  Symmetrical.  No dominant, fixed or suspicious masses are noted.  No skin or nipple changes or axillary nodes.    Nipples everted.      HEART:  RRR with  out murmur.  ABDOMEN: Soft without masses , tenderness or organomegaly.  No CVA tenderness.  Uterus palpable at size equal to dates.  Well healed scar from  section.  MUSCULOSKELETAL:  Full range of motion  GENITALIA: EGBUS normal. Vulva reveals no erythema or lesions.       VAGINA:  pink, normal ruga and discharge, no lesions.        CERVIX:  smooth, without discharge or CMT .                  EXTREMITIES:  No edema. No significant varicosities.    ASSESSMENT:  Intrauterine pregnancy of 11w6d  (Z34.01) Encounter for supervision of normal first pregnancy in first trimester  (primary encounter diagnosis)  Comment:   Plan: UA without Microscopic, Urine Culture " Aerobic         Bacterial, CBC with platelets, HIV Antigen         Antibody Combo, Rubella Antibody IgG         Quantitative, Hepatitis B surface antigen,         Treponema Abs w Reflex to RPR and Titer, ABO/Rh        type and screen, Pap imaged thin layer screen         with HPV - recommended age 30 - 65 years         (select HPV order below), HPV High Risk Types         DNA Cervical            (Z23) Need for Tdap vaccination  Comment:   Plan: Pap imaged thin layer screen with HPV -         recommended age 30 - 65 years (select HPV order        below)              PLAN:  Options for  testing for chromosomal and birth defects were discussed with the patient. Diagnostic tests include CVS and Amniocentesis. We discussed that these tests are definitive but invasive and do carry a risk of fetal loss.   Screening tests include nuchal translucency/blood marker testing in the first trimester and quad screening in the second trimester. We discussed that these are screening tests and not diagnostic tests and that false positives and negatives are a distinct possibility.  Wants to do quad.    Instructed on best evidence for: weight gain for her weight and height for pregnancy; healthy diet and foods to avoid; exercise and activity during pregnancy;avoiding exposure to toxoplasmosis; and maintenance of a generally healthy lifestyle.     Int[facility] hospital for [facility] is [facility].    Reviewed transmission of and avoidance strategies for CMV.    Discussed travel cautions.    She had her parnter  have   not traveled to areas currently infected with zika in the past 6 months and currently have  no plans to travel to an area infected with Zika.   If travel plans change they will inform us.        Declines gc/ct testing.

## 2018-08-07 NOTE — NURSING NOTE
"Chief Complaint   Patient presents with     Prenatal Care       Initial /66 (BP Location: Left arm, Patient Position: Sitting, Cuff Size: Adult Large)  Pulse 88  Ht 5' 3.25\" (1.607 m)  Wt 215 lb 12 oz (97.9 kg)  LMP 05/12/2018 (Exact Date)  BMI 37.92 kg/m2 Estimated body mass index is 37.92 kg/(m^2) as calculated from the following:    Height as of this encounter: 5' 3.25\" (1.607 m).    Weight as of this encounter: 215 lb 12 oz (97.9 kg).  Medication Reconciliation: complete    Angela Casey CMA    "

## 2018-08-07 NOTE — MR AVS SNAPSHOT
After Visit Summary   2018    Scotty Garber    MRN: [id number]           Patient Information     Date Of Birth          1990        Visit Information        Provider Department      2018 3:30 PM Maria Victoria Diaz APRN [facility]        Today's Diagnoses     Supervision of other normal pregnancy, antepartum    -  1    Need for Tdap vaccination        History of 2  sections          Care Instructions    How to prevent CMV or cytomegalovirus infection while you are pregnant:    Thoroughly wash your hands with soap and warm water especially after doing things such as:  Diaper changes  Feeding or bathing a child  Wiping a child's runny nose or drool  Handling a child's toys    Do not share cups, plates, utensils, toothbrushes or food with your children  Do not kiss young children on the mouth or cheek. Instead, kiss them on the head or give them a hug.  Do not share towels or washcloths with young children.  Clean toy, cou.nter tops, and other surfaces that come in contact with urine or saliva.\      LISTERIA  Individuals can reduce the risk for listeria contamination through proper food selection, handling, and storage, such as:  Rinsing raw produce (fuits and vegetables) before eating, cutting, or cooking;  Keeping refrigerators at 40 degrees F or lower;  Buying soft cheeses only if their labels state that they are made with pasteurized milk.  Also avoiding cheese that has not been initially wrapped in plastic.  These cheeses include brie, camembert, blue and the soft Mexican cheeses like con queso.  Heating all food that can be heated but especially hot dogs, luncheon meats, and cold cuts to an internal temperature of 165 degrees F or until steaming hot before serving them; and  Washing your hands for at least 20 seconds with warm water and soap before and after handling cantaloupes or other melons.  Watch for food recalls for listeria and contact us if you  believe you have been exposed.               First line remedies for nausea in pregnancy    Eat small frequent meals every 2-3 hours if possible.   Avoid food at extremes of temparture and drinks with carbination.  Eat foods that appeal to you, avoiding fats and spicy foods.  Bread, pasta, crackers, potatoes, and rice tend to be tolerated the best.  Don't worry about what you eat in the first 3 months, it is more important that you can eat and keep it down.   Try flat ginger ale.  Ginger is a herbal remedy for nausea and you can use it in any form.  There are ginger tablets you can purchase.  The dose is 500 to 1000 mg a day.   You may also try doxylamine 12.5 mg three times a day which is a sleeping medication along with Vitamin B6 25 mg three times a day.  This combination takes up to a week to work so give it some time.   Doxylamine is sometimes called Unisom and it comes in 25 mg tablets so you will have to break it in half and take half a tablet.   If you begin to vomit more than 5 or 6 times a day and feel that you are unable to keep anything down, call the clinic for an appointment to be seen.  [place] [phone].        Thank for choosing our clinic for your health care needs. Our goal is to provided you with excellent care. One way that we continue to improve our care is by listening to our patients. Please take a few minutes to complete the written survey that you may receive in the mail after your visit.     You may reach your care team by calling the following number:    [place]- [phone]   For clinic hours at [facility]  please visit the [place] web site [email or web address]    Notification of your lab results:  Normal or non-critical lab and imaging results will be communicated to you by Mychart, letter, or phone within 7 days. If you do not hear from us within 10 days, please contact us through Mychart or phone. If you have a critical or abnormal lab result, we will notify you by  phone as soon as possible.      After Hours nurse advice:  [place] [facility]:  [phone]     Medication Refills:  Please contact your pharmacy and they will forward the refill to us. Please allow 3 business days for your refills to be completed.     Secure access to your medical record:  Use Conatix (secure email communication and access to your chart) to send your primary care provider a message or make an appointment. Ask someone on your Team how to sign up for Conatix. To log on to Otterology or for more information in Conatix please visit the website at [email or web address].        Fruits and vegetables high in pesticide contamination  Strawberries  Spinach  Nectarines  Peaches  Apples  Grapes  Cherries  Pears  Tomatoes  Celery  Potatoes  Sweet Peppers.     Consider extra washing of these fruits and vegetables, peeling if possible or purchasing organics.     Fruits and vegetables lowest if pesticide contramination:  Avocado  Sweet corn  Pineapple  Cabbage   Onions   Frozen peas  Papaya  Asparagus  Deon  Eggplant  Honeydew  Kiwi  Cantaloupe  Cauliflower  Broccoli                  Follow-ups after your visit        Who to contact     If you have questions or need follow up information about today's clinic visit or your schedule please contact [facility] directly at [phone].  Normal or non-critical lab and imaging results will be communicated to you by MyChart, letter or phone within 4 business days after the clinic has received the results. If you do not hear from us within 7 days, please contact the clinic through Athenixhart or phone. If you have a critical or abnormal lab result, we will notify you by phone as soon as possible.  Submit refill requests through Veronica or call your pharmacy and they will forward the refill request to us. Please allow 3 business days for your refill to be completed.          Additional Information About Your Visit        AthenixhariMICROQ Information     Veronica  "gives you secure access to your electronic health record. If you see a primary care provider, you can also send messages to your care team and make appointments. If you have questions, please call your primary care clinic.  If you do not have a primary care provider, please call [phone] and they will assist you.        Care EveryWhere ID     This is your Care EveryWhere ID. This could be used by other organizations to access your [place] medical records  [id number]        Your Vitals Were     Pulse Height Last Period BMI (Body Mass Index)          88 5' 3.25\" (1.607 m) 05/12/2018 (Exact Date) 37.92 kg/m2         Blood Pressure from Last 3 Encounters:   08/07/18 102/66   06/18/18 132/80   07/10/17 96/70    Weight from Last 3 Encounters:   08/07/18 215 lb 12 oz (97.9 kg)   06/18/18 216 lb (98 kg)   06/13/17 204 lb 12.8 oz (92.9 kg)              We Performed the Following     ABO/Rh type and screen     CBC with platelets     Hepatitis B surface antigen     HIV Antigen Antibody Combo     HPV High Risk Types DNA Cervical     Pap imaged thin layer screen with HPV - recommended age 30 - 65 years (select HPV order below)     Rubella Antibody IgG Quantitative     Treponema Abs w Reflex to RPR and Titer     UA without Microscopic     Urine Culture Aerobic Bacterial        Primary Care Provider Office Phone # Fax #    [facility] [phone]       [address] [place] [address]        Equal Access to Services     SHARONDA GRAY AH: Hadii spring brantleyo Jasper, waaxda luqadaha, qaybta kaalmada porfirio, latha marino. So [facility] [phone].    ATENCIÓN: Si habla español, tiene a nixon disposición servicios gratuitos de asistencia lingüística. Llame al [phone].    We comply with applicable federal civil rights laws and [place] laws. We do not discriminate on the basis of race, color, national origin, age, disability, sex, sexual orientation, or gender " identity.            Thank you!     Thank you for choosing [facility]  for your care. Our goal is always to provide you with excellent care. Hearing back from our patients is one way we can continue to improve our services. Please take a few minutes to complete the written survey that you may receive in the mail after your visit with us. Thank you!             Your Updated Medication List - Protect others around you: Learn how to safely use, store and throw away your medicines at www.disposemymeds.org.          This list is accurate as of 8/7/18  4:17 PM.  Always use your most recent med list.                   Brand Name Dispense Instructions for use Diagnosis    PRENATAL VITAMIN PO

## 2018-08-07 NOTE — PATIENT INSTRUCTIONS
How to prevent CMV or cytomegalovirus infection while you are pregnant:    Thoroughly wash your hands with soap and warm water especially after doing things such as:  Diaper changes  Feeding or bathing a child  Wiping a child's runny nose or drool  Handling a child's toys    Do not share cups, plates, utensils, toothbrushes or food with your children  Do not kiss young children on the mouth or cheek. Instead, kiss them on the head or give them a hug.  Do not share towels or washcloths with young children.  Clean toy, cou.nter tops, and other surfaces that come in contact with urine or saliva.\      LISTERIA  Individuals can reduce the risk for listeria contamination through proper food selection, handling, and storage, such as:  Rinsing raw produce (fuits and vegetables) before eating, cutting, or cooking;  Keeping refrigerators at 40 degrees F or lower;  Buying soft cheeses only if their labels state that they are made with pasteurized milk.  Also avoiding cheese that has not been initially wrapped in plastic.  These cheeses include brie, camembert, blue and the soft Mexican cheeses like con queso.  Heating all food that can be heated but especially hot dogs, luncheon meats, and cold cuts to an internal temperature of 165 degrees F or until steaming hot before serving them; and  Washing your hands for at least 20 seconds with warm water and soap before and after handling cantaloupes or other melons.  Watch for food recalls for listeria and contact us if you believe you have been exposed.               First line remedies for nausea in pregnancy    Eat small frequent meals every 2-3 hours if possible.   Avoid food at extremes of temparture and drinks with carbination.  Eat foods that appeal to you, avoiding fats and spicy foods.  Bread, pasta, crackers, potatoes, and rice tend to be tolerated the best.  Don't worry about what you eat in the first 3 months, it is more important that you can eat and keep it down.    Try flat ginger ale.  Ginger is a herbal remedy for nausea and you can use it in any form.  There are ginger tablets you can purchase.  The dose is 500 to 1000 mg a day.   You may also try doxylamine 12.5 mg three times a day which is a sleeping medication along with Vitamin B6 25 mg three times a day.  This combination takes up to a week to work so give it some time.   Doxylamine is sometimes called Unisom and it comes in 25 mg tablets so you will have to break it in half and take half a tablet.   If you begin to vomit more than 5 or 6 times a day and feel that you are unable to keep anything down, call the clinic for an appointment to be seen.  [place] [phone].        Thank for choosing our clinic for your health care needs. Our goal is to provided you with excellent care. One way that we continue to improve our care is by listening to our patients. Please take a few minutes to complete the written survey that you may receive in the mail after your visit.     You may reach your care team by calling the following number:    [place]- [phone]   For clinic hours at [facility]  please visit the [place] web site [email or web address]    Notification of your lab results:  Normal or non-critical lab and imaging results will be communicated to you by Moving Off Campushart, letter, or phone within 7 days. If you do not hear from us within 10 days, please contact us through [employer] or phone. If you have a critical or abnormal lab result, we will notify you by phone as soon as possible.      After Hours nurse advice:  [place] [facility]:  [phone]     Medication Refills:  Please contact your pharmacy and they will forward the refill to us. Please allow 3 business days for your refills to be completed.     Secure access to your medical record:  Use UGE (secure email communication and access to your chart) to send your primary care provider a message or make an appointment. Ask someone on your Team how  to sign up for Food.ee. To log on to DocsInk or for more information in Food.ee please visit the website at [email or web address].        Fruits and vegetables high in pesticide contamination  Strawberries  Spinach  Nectarines  Peaches  Apples  Grapes  Cherries  Pears  Tomatoes  Celery  Potatoes  Sweet Peppers.     Consider extra washing of these fruits and vegetables, peeling if possible or purchasing organics.     Fruits and vegetables lowest if pesticide contramination:  Avocado  Sweet corn  Pineapple  Cabbage   Onions   Frozen peas  Papaya  Asparagus  Deon  Eggplant  Honeydew  Kiwi  Cantaloupe  Cauliflower  Broccoli

## 2018-08-08 LAB
BACTERIA SPEC CULT: NORMAL
BACTERIA SPEC CULT: NORMAL
HBV SURFACE AG SERPL QL IA: NONREACTIVE
HIV 1+2 AB+HIV1 P24 AG SERPL QL IA: NONREACTIVE
Lab: NORMAL
RUBV IGG SERPL IA-ACNC: 20 IU/ML
SPECIMEN SOURCE: NORMAL
T PALLIDUM AB SER QL: NONREACTIVE

## 2018-08-09 LAB
COPATH REPORT: NORMAL
PAP: NORMAL

## 2018-08-10 LAB
FINAL DIAGNOSIS: NORMAL
HPV HR 12 DNA CVX QL NAA+PROBE: NEGATIVE
HPV16 DNA SPEC QL NAA+PROBE: NEGATIVE
HPV18 DNA SPEC QL NAA+PROBE: NEGATIVE
SPECIMEN DESCRIPTION: NORMAL
SPECIMEN SOURCE CVX/VAG CYTO: NORMAL

## 2018-08-15 ENCOUNTER — TELEPHONE (OUTPATIENT)
Dept: FAMILY MEDICINE | Facility: OTHER | Age: 28
End: 2018-08-15

## 2018-08-15 ENCOUNTER — MYC MEDICAL ADVICE (OUTPATIENT)
Dept: OBGYN | Facility: OTHER | Age: 28
End: 2018-08-15

## 2018-08-15 NOTE — TELEPHONE ENCOUNTER
Scotty Garber is a 27 year old female who calls with vomiting blood.    PRESENTING PROBLEM:  Vomiting    I spoke with pt who states she has been vomiting a lot and today she noticed some blood in her saliva. 1 time 1/2 hour ago. Small amount. Feels a little dehydrated.        NURSING ASSESSMENT:  EDC:   2018   Weeks gestation:  13w0d  /Para:   Primary MD:  Giovanny Nunez Appt:  2018    Next appt:    Next 5 appointments (look out 90 days)     Sep 13, 2018  5:00 PM CDT   ESTABLISHED PRENATAL with JEFF Hall CNM   [facility] ([facility])    [address] [facility] [id number] [phone]                Low back pain:  no    Pelvic pressure:  no  Cramping:  no  Contractions: No  Vaginal discharge: None  Other symptoms: None    RECOMMENDED DISPOSITION:  Home care advice - If it happens again or she is not able to keep fluids down she will go to ED today  Will comply with recommendation: Yes  If further questions/concerns or if symptoms do not improve, worsen or new symptoms develop, call your PCP or [place] [facility] as soon as possible.    Guideline used: 1st Trimester Nausea and vomiting  Telephone Triage for Obstetrics and Gynecology, Jessica Sifuentes and Dionna Madrid RN

## 2018-09-06 ENCOUNTER — PRENATAL OFFICE VISIT (OUTPATIENT)
Dept: OBGYN | Facility: OTHER | Age: 28
End: 2018-09-06
Payer: COMMERCIAL

## 2018-09-06 VITALS
SYSTOLIC BLOOD PRESSURE: 118 MMHG | HEART RATE: 80 BPM | DIASTOLIC BLOOD PRESSURE: 70 MMHG | WEIGHT: 221.75 LBS | BODY MASS INDEX: 38.97 KG/M2

## 2018-09-06 DIAGNOSIS — Z34.82 ENCOUNTER FOR SUPERVISION OF OTHER NORMAL PREGNANCY IN SECOND TRIMESTER: Primary | ICD-10-CM

## 2018-09-06 PROCEDURE — 36415 COLL VENOUS BLD VENIPUNCTURE: CPT | Performed by: ADVANCED PRACTICE MIDWIFE

## 2018-09-06 PROCEDURE — 99000 SPECIMEN HANDLING OFFICE-LAB: CPT | Performed by: ADVANCED PRACTICE MIDWIFE

## 2018-09-06 PROCEDURE — 99207 ZZC PRENATAL VISIT: CPT | Performed by: ADVANCED PRACTICE MIDWIFE

## 2018-09-06 PROCEDURE — 81511 FTL CGEN ABNOR FOUR ANAL: CPT | Mod: 90 | Performed by: ADVANCED PRACTICE MIDWIFE

## 2018-09-06 NOTE — MR AVS SNAPSHOT
After Visit Summary   9/6/2018    Scotty Garber    MRN: [id number]           Patient Information     Date Of Birth          1990        Visit Information        Provider Department      9/6/2018 4:30 PM Maria Victoria Diaz APRN CNM [facility]        Today's Diagnoses     Encounter for supervision of other normal pregnancy in second trimester    -  1       Follow-ups after your visit        Follow-up notes from your care team     Return in about 1 month (around 10/6/2018).      Future tests that were ordered for you today     Open Future Orders        Priority Expected Expires Ordered    US OB > 14 Weeks Complete Single Routine  9/6/2019 9/6/2018            Who to contact     If you have questions or need follow up information about today's clinic visit or your schedule please contact [facility] directly at [phone].  Normal or non-critical lab and imaging results will be communicated to you by Zeligsofthart, letter or phone within 4 business days after the clinic has received the results. If you do not hear from us within 7 days, please contact the clinic through Zeligsofthart or phone. If you have a critical or abnormal lab result, we will notify you by phone as soon as possible.  Submit refill requests through Spout or call your pharmacy and they will forward the refill request to us. Please allow 3 business days for your refill to be completed.          Additional Information About Your Visit        MyChart Information     Spout gives you secure access to your electronic health record. If you see a primary care provider, you can also send messages to your care team and make appointments. If you have questions, please call your primary care clinic.  If you do not have a primary care provider, please call [phone] and they will assist you.        Care EveryWhere ID     This is your Care EveryWhere ID. This could be used by other organizations to access your  [place] medical records  [id number]        Your Vitals Were     Pulse Last Period BMI (Body Mass Index)             80 05/12/2018 (Exact Date) 38.97 kg/m2          Blood Pressure from Last 3 Encounters:   09/06/18 118/70   08/07/18 102/66   06/18/18 132/80    Weight from Last 3 Encounters:   09/06/18 221 lb 12 oz (100.6 kg)   08/07/18 215 lb 12 oz (97.9 kg)   06/18/18 216 lb (98 kg)              We Performed the Following     Maternal Quad Marker 2nd Trimester        Primary Care Provider Office Phone # Fax #    [facility] [phone]       [address] [facility] [address]        Equal Access to Services     SHARONDA GRAY : Irma brantleyo [place]asper, waaxda luqadaha, qaybta kaalmada adeegyaarnold, latha marino. So [facility] [phone].    ATENCIÓN: Si habla español, tiene a nixon disposición servicios gratuitos de asistencia lingüística. [facility] [phone].    We comply with applicable federal civil rights laws and [place] laws. We do not discriminate on the basis of race, color, national origin, age, disability, sex, sexual orientation, or gender identity.            Thank you!     Thank you for choosing [facility]  for your care. Our goal is always to provide you with excellent care. Hearing back from our patients is one way we can continue to improve our services. Please take a few minutes to complete the written survey that you may receive in the mail after your visit with us. Thank you!             Your Updated Medication List - Protect others around you: Learn how to safely use, store and throw away your medicines at www.disposemymeds.org.          This list is accurate as of 9/6/18  4:45 PM.  Always use your most recent med list.                   Brand Name Dispense Instructions for use Diagnosis    PRENATAL VITAMIN PO

## 2018-09-06 NOTE — PROGRESS NOTES
Still nauseated and vomiting from time to time.  Has recently started unisom and b6 so feeling a bit better.   Will call if no improvement.   Wants to do quad.   Will probably do flu shot but later.   Will do ultrasound next visit.  Will see Dr Baltazar to discuss c/s and tubal.   RTC 4 weeks.   Maria Victoria Lezama, JEFF, CNM

## 2018-09-10 LAB
# FETUSES US: NORMAL
# FETUSES: NORMAL
AFP ADJ MOM AMN: 0.79
AFP SERPL-MCNC: 20 NG/ML
AGE - REPORTED: 29.2 YR
CURRENT SMOKER: NO
CURRENT SMOKER: NO
DIABETES STATUS PATIENT: NO
FAMILY MEMBER DISEASES HX: NO
FAMILY MEMBER DISEASES HX: NO
GA METHOD: NORMAL
GA METHOD: NORMAL
GA: NORMAL WK
HCG MOM SERPL: 0.67
HCG SERPL-ACNC: NORMAL IU/L
HX OF HEREDITARY DISORDERS: NO
IDDM PATIENT QL: NO
INHIBIN A MOM SERPL: 0.58
INHIBIN A SERPL-MCNC: 83 PG/ML
INTEGRATED SCN PATIENT-IMP: NORMAL
IVF PREGNANCY: NORMAL
LMP START DATE: NORMAL
MONOCHORIONIC TWINS: NO
PATHOLOGY STUDY: NORMAL
PREV FETUS DEFECT: NO
SERVICE CMNT-IMP: NO
SPECIMEN DRAWN SERPL: NORMAL
U ESTRIOL MOM SERPL: 1.14
U ESTRIOL SERPL-MCNC: 0.89 NG/ML
VALPROIC/CARBAMAZEPINE STATUS: NO
WEIGHT UNITS: NORMAL

## 2018-10-02 ENCOUNTER — PRENATAL OFFICE VISIT (OUTPATIENT)
Dept: OBGYN | Facility: CLINIC | Age: 28
End: 2018-10-02
Payer: COMMERCIAL

## 2018-10-02 ENCOUNTER — MYC MEDICAL ADVICE (OUTPATIENT)
Dept: OBGYN | Facility: OTHER | Age: 28
End: 2018-10-02

## 2018-10-02 ENCOUNTER — RADIANT APPOINTMENT (OUTPATIENT)
Dept: ULTRASOUND IMAGING | Facility: CLINIC | Age: 28
End: 2018-10-02
Attending: ADVANCED PRACTICE MIDWIFE
Payer: COMMERCIAL

## 2018-10-02 VITALS
SYSTOLIC BLOOD PRESSURE: 123 MMHG | BODY MASS INDEX: 39.72 KG/M2 | HEART RATE: 98 BPM | OXYGEN SATURATION: 98 % | WEIGHT: 226 LBS | DIASTOLIC BLOOD PRESSURE: 67 MMHG

## 2018-10-02 DIAGNOSIS — Z30.2 ENCOUNTER FOR STERILIZATION: ICD-10-CM

## 2018-10-02 DIAGNOSIS — Z34.92 NORMAL PREGNANCY IN SECOND TRIMESTER: Primary | ICD-10-CM

## 2018-10-02 DIAGNOSIS — Z34.82 ENCOUNTER FOR SUPERVISION OF OTHER NORMAL PREGNANCY IN SECOND TRIMESTER: ICD-10-CM

## 2018-10-02 LAB — RADIOLOGIST FLAGS: NORMAL

## 2018-10-02 PROCEDURE — 99207 ZZC PRENATAL VISIT: CPT | Performed by: OBSTETRICS & GYNECOLOGY

## 2018-10-02 PROCEDURE — 76805 OB US >/= 14 WKS SNGL FETUS: CPT | Performed by: STUDENT IN AN ORGANIZED HEALTH CARE EDUCATION/TRAINING PROGRAM

## 2018-10-02 NOTE — MR AVS SNAPSHOT
After Visit Summary   10/2/2018    Scotty Garber    MRN: [id number]           Patient Information     Date Of Birth          1990        Visit Information        Provider Department      10/2/2018 10:15 AM Violette Bal[facility]zar DO [facility]        Today's Diagnoses     Normal pregnancy in second trimester    -  1    Encounter for sterilization          Care Instructions    What to watch out for are: regular contractions every 5 min, vaginal bleeding, decreased fetal movement, or leakage of fluid.  Please call the office or go to L&D if you develop any of these signs and symptoms.      I will see you for your follow up appointment.  Please feel free to call if you have any questions or concerns.      Thanks,  Violette Baltazar DO            Follow-ups after your visit        Follow-up notes from your care team     Return in about 4 weeks (around 10/30/2018).      Your next 10 appointments already scheduled     Nov 09, 2018  4:45 PM CST   [facility]    [address] [facility] [address]   [phone]            Nov 26, 2018  9:00 AM CST   [facility]    [address] [facility] [address]   [phone]            Dec 10, 2018  9:00 AM CST   ESTABLISHED PRENATAL with Violette Baltazar DO   [facility] ([facility]    [address] [facility] [address]   [phone]            Dec 31, 2018  9:00 AM CST   ESTABLISHED PRENATAL with Violette Baltazar DO   [facility] ([facility]    [address] [facility] [address]   [phone]              Future tests that were ordered for you today     Open Future Orders        Priority Expected Expires Ordered    Glucose tolerance gest screen 1 hour Routine   11/2/2018 10/2/2018    OB hemoglobin Routine  11/2/2018 10/2/2018            Who to contact     If you have questions or need follow up information about today's clinic visit or your schedule please contact [facility] directly at [phone].  Normal or non-critical lab and imaging results will be communicated to you by MyChart, letter or phone within 4 business days after the clinic has received the results. If you do not hear from us within 7 days, please contact the clinic through Terrafugiahart or phone. If you have a critical or abnormal lab result, we will notify you by phone as soon as possible.  Submit refill requests through IntoOutdoors or call your pharmacy and they will forward the refill request to us. Please allow 3 business days for your refill to be completed.          Additional Information About Your Visit        MyChart Information     IntoOutdoors gives you secure access to your electronic health record. If you see a primary care provider, you can also send messages to your care team and make appointments. If you have questions, please call your primary care clinic.  If you do not have a primary care provider, please call [phone] and they will assist you.        Care EveryWhere ID     This is your Care EveryWhere ID. This could be used by other organizations to access your [place] medical records  [id number]        Your Vitals Were     Pulse Last Period Pulse Oximetry BMI (Body Mass Index)          98 05/12/2018 (Exact Date) 98% 39.72 kg/m2         Blood Pressure from Last 3 Encounters:   10/02/18 123/67   09/06/18 118/70   08/07/18 102/66    Weight from Last 3 Encounters:   10/02/18 226 lb (102.5 kg)   09/06/18 221 lb 12 oz (100.6 kg)   08/07/18 215 lb 12 oz (97.9 kg)               Primary Care Provider Office Phone # Fax #    [facility] [phone]       [address] [place] [address]        Equal Access to Services     SHARONDA GRAY AH: Irma robledo  daniela Hutchinson, zhanna titussonnyha, corrinata kayan aleacata, latha janisin hayaamandeep cosbypeter tellyjefferymeaghan brightAndreajen ned. So [facility] [phone].    ATENCIÓN: Si habla español, tiene a nixon disposición servicios gratuitos de asistencia lingüística. Suzette al [phone].    We comply with applicable federal civil rights laws and [place] laws. We do not discriminate on the basis of race, color, national origin, age, disability, sex, sexual orientation, or gender identity.            Thank you!     Thank you for choosing [facility]  for your care. Our goal is always to provide you with excellent care. Hearing back from our patients is one way we can continue to improve our services. Please take a few minutes to complete the written survey that you may receive in the mail after your visit with us. Thank you!             Your Updated Medication List - Protect others around you: Learn how to safely use, store and throw away your medicines at www.disposemymeds.org.          This list is accurate as of 10/2/18 10:37 AM.  Always use your most recent med list.                   Brand Name Dispense Instructions for use Diagnosis    PRENATAL VITAMIN PO

## 2018-10-02 NOTE — TELEPHONE ENCOUNTER
PT called back and was informed that the US looked good but due to the position they were not able to see everything well ( the heart and the facial profile). T repeat the US in a month.  Tiffany Potts, CMA

## 2018-10-02 NOTE — PROGRESS NOTES
27 year old  at 20w3d weeks presents to the clinic for a routine prenatal visit.  Feeling well.  No vaginal bleeding, leakage of fluid, or contractions   Fundal height=s=d  DUQe=635  Flu vaccine today  RTC 4 weeks.    Violette Baltazar

## 2018-10-08 ENCOUNTER — ALLIED HEALTH/NURSE VISIT (OUTPATIENT)
Dept: FAMILY MEDICINE | Facility: OTHER | Age: 28
End: 2018-10-08
Payer: COMMERCIAL

## 2018-10-08 DIAGNOSIS — Z23 NEED FOR PROPHYLACTIC VACCINATION AND INOCULATION AGAINST INFLUENZA: Primary | ICD-10-CM

## 2018-10-08 PROCEDURE — 90686 IIV4 VACC NO PRSV 0.5 ML IM: CPT

## 2018-10-08 PROCEDURE — 90471 IMMUNIZATION ADMIN: CPT

## 2018-10-08 PROCEDURE — 99207 ZZC NO CHARGE NURSE ONLY: CPT

## 2018-10-08 NOTE — PROGRESS NOTES

## 2018-10-08 NOTE — MR AVS SNAPSHOT
After Visit Summary   10/8/2018    Scotty Garber    MRN: [id number]           Patient Information     Date Of Birth          1990        Visit Information        Provider Department      10/8/2018 4:00 PM NL FLU SHOT [facility] [place]        Today's Diagnoses     Need for prophylactic vaccination and inoculation against influenza    -  1       Follow-ups after your visit        Your next 10 appointments already scheduled     Oct 29, 2018  9:20 AM CDT   US OB SINGLE FOLLOW UP REPEAT with ERUS1   [facility]    [address] [address]   [phone]           How do I prepare for my exam? (Food and drink instructions) Drink four 8-ounce glasses of fluid an hour before your exam. If you need to empty your bladder before your exam, try to release only a little urine. Then, drink another glass of fluid.  How do I prepare for my exam? (Other instructions) You may have up to two family members in the exam room. If you bring a small child, an adult must be there to care for him or her. No video or camera photography during the procedure.  What should I wear: Wear comfortable clothes.  How long does the exam take: Most ultrasounds take 30 to 60 minutes.  What should I bring: Bring a list of your medicines, including vitamins, minerals and over-the-counter drugs. It is safest to leave personal items at home.  Do I need a :  No  is needed.  What do I need to tell my doctor: Tell your doctor about any allergies you may have.  What should I do after the exam: No restrictions, You may resume normal activities.  What is this test: An ultrasound uses sound waves to make pictures of the body. Sound waves do not cause pain. The only discomfort may be the pressure of the wand against your skin or full bladder.  Who should I call with questions: If you have any questions, please call the Imaging Department where you will  have your exam. Directions, parking instructions, and other information is available on our website, [email or web address].            Nov 09, 2018  4:45 PM CST   ESTABLISHED PRENATAL with Violettejelly Baltazar DO   [facility] ([facility])    [address]  [place] [address]   [phone]            Nov 26, 2018  9:00 AM CST   ESTABLISHED PRENATAL with Violette Tarsha Giovanny,    [facility] ([facility])    [address]  [place] [address]   [phone]            Dec 10, 2018  9:00 AM CST   ESTABLISHED PRENATAL with Violette Tarsha Giovanny,    [facility] ([facility])    [address]  [place] [address]   [phone]            Dec 31, 2018  9:00 AM CST   ESTABLISHED PRENATAL with Violette Tarsha DO Giovanny   [facility] ([facility]    [address]  [place] [address]   [phone]            Jan 07, 2019  9:00 AM CST   ESTABLISHED PRENATAL with Violette Tarsha DO Giovanny   [facility]    [address]  [place] [address]   [phone]            Jan 14, 2019  9:00 AM CST   ESTABLISHED PRENATAL with Genevieve Gann MD   [facility] ([facility])    [address]  [place] [address]   [phone]            Jan 21, 2019  9:00 AM CST   ESTABLISHED PRENATAL with Violette Tarsha DO Giovanny   [facility]    [address]  [place] [address]   [phone]            Jan 28, 2019  9:00 AM CST   ESTABLISHED PRENATAL with Violette Tarsha EliseoilDO   [facility]    [address]  [place] [address]   [phone]            Feb 04, 2019  9:00 AM CST   ESTABLISHED PRENATAL with Violette Tarsha DO Giovanny   [facility]    [address] [facility]  [id number] [phone]              Who to contact     If you have questions or need follow up information about today's clinic visit or your schedule please contact [facility] directly at [phone].  Normal or non-critical lab and imaging results will be communicated to you by MyChart, letter or phone within 4 business days after the clinic has received the results. If you do not hear from us within 7 days, please contact the clinic through Doubles Alleyhart or phone. If you have a critical or abnormal lab result, we will notify you by phone as soon as possible.  Submit refill requests through Shaser or call your pharmacy and they will forward the refill request to us. Please allow 3 business days for your refill to be completed.          Additional Information About Your Visit        Doubles AlleyharInfoRemate Information     Shaser gives you secure access to your electronic health record. If you see a primary care provider, you can also send messages to your care team and make appointments. If you have questions, please call your primary care clinic.  If you do not have a primary care provider, please call [phone] and they will assist you.        Care EveryWhere ID     This is your Care EveryWhere ID. This could be used by other organizations to access your [facility] medical records  [id number]        Your Vitals Were     Last Period                   05/12/2018 (Exact Date)            Blood Pressure from Last 3 Encounters:   10/02/18 123/67   09/06/18 118/70   08/07/18 102/66    Weight from Last 3 Encounters:   10/02/18 226 lb (102.5 kg)   09/06/18 221 lb 12 oz (100.6 kg)   08/07/18 215 lb 12 oz (97.9 kg)              We Performed the Following     FLU VACCINE, SPLIT VIRUS, IM (QUADRIVALENT) [53695]- >3 YRS     Vaccine Administration, Initial [81926]        Primary Care Provider Office Phone # Fax #    JEFF Mckinney [phone] [address] [facility] [address]         Equal Access to Services     [facility] : Hadii aad ku hadleilanimaykle Hutchinson, wajadenda luqadaha, qasaraita chettrevinlatha dawson. So [facility] [phone].    ATENCIÓN: Si habla español, tiene a nixon disposición servicios gratuitos de asistencia lingüística. Llame al [phone].    We comply with applicable federal civil rights laws and [place] laws. We do not discriminate on the basis of race, color, national origin, age, disability, sex, sexual orientation, or gender identity.            Thank you!     Thank you for choosing [facility]  for your care. Our goal is always to provide you with excellent care. Hearing back from our patients is one way we can continue to improve our services. Please take a few minutes to complete the written survey that you may receive in the mail after your visit with us. Thank you!             Your Updated Medication List - Protect others around you: Learn how to safely use, store and throw away your medicines at www.disposemymeds.org.          This list is accurate as of 10/8/18  5:10 PM.  Always use your most recent med list.                   Brand Name Dispense Instructions for use Diagnosis    PRENATAL VITAMIN PO

## 2018-10-26 ENCOUNTER — MYC MEDICAL ADVICE (OUTPATIENT)
Dept: OBGYN | Facility: OTHER | Age: 28
End: 2018-10-26

## 2018-10-26 NOTE — TELEPHONE ENCOUNTER
LMTC.  Ok to leave a message per demographics page.  Left a message that Dr. Baltazar recommends to present to L&D for evaluation and monitoring for a few hours. 11:22 am spoke to pt.  Pt reports rolled her ankle and fell down 5-6 steps into garage landing hands first and on right hip.  Denies bleeding and LOF.  Denies contractions, reports some cramping.  Advised to present to L&D for evaluation and monitoring. Pt agreed with plan of care. Arrival estimated approximately 12:00 noon.  Dr. Wagner updated.  Pt .  23 weeks and 6 days.  Zoey Ojeda, RN on 10/26/2018 at 11:31 AM
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
## 2018-10-29 ENCOUNTER — RADIANT APPOINTMENT (OUTPATIENT)
Dept: ULTRASOUND IMAGING | Facility: OTHER | Age: 28
End: 2018-10-29
Attending: ADVANCED PRACTICE MIDWIFE
Payer: COMMERCIAL

## 2018-10-29 DIAGNOSIS — Z34.82 ENCOUNTER FOR SUPERVISION OF OTHER NORMAL PREGNANCY IN SECOND TRIMESTER: ICD-10-CM

## 2018-10-29 PROCEDURE — 76816 OB US FOLLOW-UP PER FETUS: CPT

## 2018-11-09 ENCOUNTER — MYC MEDICAL ADVICE (OUTPATIENT)
Dept: OBGYN | Facility: OTHER | Age: 28
End: 2018-11-09

## 2018-11-09 ENCOUNTER — PRENATAL OFFICE VISIT (OUTPATIENT)
Dept: OBGYN | Facility: OTHER | Age: 28
End: 2018-11-09
Payer: COMMERCIAL

## 2018-11-09 VITALS
HEART RATE: 104 BPM | BODY MASS INDEX: 41.39 KG/M2 | DIASTOLIC BLOOD PRESSURE: 60 MMHG | SYSTOLIC BLOOD PRESSURE: 118 MMHG | WEIGHT: 235.5 LBS

## 2018-11-09 DIAGNOSIS — Z30.2 ENCOUNTER FOR STERILIZATION: ICD-10-CM

## 2018-11-09 DIAGNOSIS — Z98.891 HISTORY OF 2 CESAREAN SECTIONS: ICD-10-CM

## 2018-11-09 DIAGNOSIS — Z34.92 NORMAL PREGNANCY IN SECOND TRIMESTER: Primary | ICD-10-CM

## 2018-11-09 LAB
GLUCOSE 1H P 50 G GLC PO SERPL-MCNC: 87 MG/DL (ref 60–129)
HGB BLD-MCNC: 12.2 G/DL (ref 11.7–15.7)

## 2018-11-09 PROCEDURE — 36415 COLL VENOUS BLD VENIPUNCTURE: CPT | Performed by: OBSTETRICS & GYNECOLOGY

## 2018-11-09 PROCEDURE — 00000218 ZZHCL STATISTIC OBHBG - HEMOGLOBIN: Performed by: OBSTETRICS & GYNECOLOGY

## 2018-11-09 PROCEDURE — 82950 GLUCOSE TEST: CPT | Performed by: OBSTETRICS & GYNECOLOGY

## 2018-11-09 PROCEDURE — 99207 ZZC PRENATAL VISIT: CPT | Performed by: OBSTETRICS & GYNECOLOGY

## 2018-11-09 NOTE — MR AVS SNAPSHOT
After Visit Summary   2018    Scotty Garbre    MRN: [id number]           Patient Information     Date Of Birth          1990        Visit Information        Provider Department      2018 4:45 PM Violette Baltazar DO [facility]        Today's Diagnoses     Normal pregnancy in second trimester    -  1    Encounter for sterilization        History of 2  sections          Care Instructions    What to watch out for are: regular contractions every 5 min, vaginal bleeding, decreased fetal movement, or leakage of fluid.  Please call the office or go to L&D if you develop any of these signs and symptoms.      I will see you for your follow up appointment.  Please feel free to call if you have any questions or concerns.      Thanks,  Violette Baltazar, DO            Follow-ups after your visit        Follow-up notes from your care team     Return in about 4 weeks (around 2018).      Your next 10 appointments already scheduled     2018  9:00 AM CST   ESTABLISHED PRENATAL with Violette Baltazar DO   [facility]    [address] [facility] [address]   [phone]            Dec 10, 2018  9:30 AM CST   ESTABLISHED PRENATAL with Genevieve Gann MD   [facility]    [address] [facility] [address]   [phone]            Dec 31, 2018  9:00 AM CST   ESTABLISHED PRENATAL with Violette Baltazar DO   [facility]    [address] [facility] [address]   [phone]            2019  9:00 AM CST   ESTABLISHED PRENATAL with Violette Baltazar DO   [facility]    [address] [facility] [address]   [phone]            2019  9:00 AM CST   ESTABLISHED PRENATAL with Genevieve Gann MD   [facility] ([facility]    [address] [facility] [address]   [phone]            Jan 24, 2019  9:00 AM CST   ESTABLISHED PRENATAL with Genevieve Gann MD   [facility]    [address] [facility] [address]   [phone]            Jan 28, 2019  9:00 AM CST   ESTABLISHED PRENATAL with Violette Baltazar DO   [facility]    [address] [facility] [facility] [address] [address]   [phone]            Feb 04, 2019  9:00 AM CST   ESTABLISHED PRENATAL with Violette Baltazar DO   [facility]    [address] [facility] [address] [facility] [address]   [phone]              Who to contact     If you have questions or need follow up information about today's clinic visit or your schedule please contact [facility] directly at [phone].  Normal or non-critical lab and imaging results will be communicated to you by "[employer]"hart, letter or phone within 4 business days after the clinic has received the results. If you do not hear from us within 7 days, please contact the clinic through Glanset or phone. If you have a critical or abnormal lab result, we will notify you by phone as soon as possible.  Submit refill requests through RADLIVE or call your pharmacy and they will forward the refill request to us. Please allow 3 business days for your refill to be completed.          Additional Information About Your Visit        "[employer]"hart Information     RADLIVE gives you secure access to your electronic health record. If you see a primary care provider, you can also send messages to your care team and make appointments. If you have questions, please call your primary care clinic.  If you do not have a primary care provider, please call [phone] and they will assist you.        Care EveryWhere ID     This is your Care EveryWhere ID. This could be used by other organizations to access your [facility]  medical records  [id number]        Your Vitals Were     Pulse Last Period BMI (Body Mass Index)             104 05/12/2018 (Exact Date) 41.39 kg/m2          Blood Pressure from Last 3 Encounters:   11/09/18 118/60   10/02/18 123/67   09/06/18 118/70    Weight from Last 3 Encounters:   11/09/18 235 lb 8 oz (106.8 kg)   10/02/18 226 lb (102.5 kg)   09/06/18 221 lb 12 oz (100.6 kg)              We Performed the Following     Glucose tolerance gest screen 1 hour     OB hemoglobin        Primary Care Provider Office Phone # Fax #    Va Mcdanielsne Petit, APRN [phone] [address] [place] [address]        Equal Access to Services     SHARONDA GRAY : Irma brantleyo Soomaali, waaxda luqadaha, qaybta kaalmada adeegyada, latha astudillo . So [facility] [phone].    ATENCIÓN: Si habla español, tiene a nixon disposición servicios gratuitos de asistencia lingüística. Llame al [phone].    We comply with applicable federal civil rights laws and [place] laws. We do not discriminate on the basis of race, color, national origin, age, disability, sex, sexual orientation, or gender identity.            Thank you!     Thank you for choosing [facility]  for your care. Our goal is always to provide you with excellent care. Hearing back from our patients is one way we can continue to improve our services. Please take a few minutes to complete the written survey that you may receive in the mail after your visit with us. Thank you!             Your Updated Medication List - Protect others around you: Learn how to safely use, store and throw away your medicines at www.disposemymeds.org.          This list is accurate as of 11/9/18  5:20 PM.  Always use your most recent med list.                   Brand Name Dispense Instructions for use Diagnosis    PRENATAL VITAMIN PO

## 2018-11-09 NOTE — PROGRESS NOTES
27 year old  at 25w6d weeks presents to the clinic for a routine prenatal visit.  Patient complains of nausea again.  She is able to keep food down.  Mostly in the afternoon.     She complains of right sided rib and side pain.  Thinks maybe musculoskeletal.  No vaginal bleeding, leakage of fluid, or contractions   Good fetal movement.  FHTs= 140's  Fundal height=29cm.  S>D.  Will wait for glucose results and consider growth ultrasound at next visit if measuring large.  History of LGA    Normal anatomy ultrasound  RTC 4 weeks  GCT today  History of 2 c sections.  Plans repeat with TL.  Will try for  earliest.  Will sign tubal consent at next visit  Blood type:A+    Violette Baltazar

## 2018-11-12 ENCOUNTER — MYC MEDICAL ADVICE (OUTPATIENT)
Dept: OBGYN | Facility: OTHER | Age: 28
End: 2018-11-12

## 2018-11-13 NOTE — TELEPHONE ENCOUNTER
Message handled by Nurse Triage with [employer] - provider name: Dr. Baltazar.  Ok to proceed as noted. Anjali Godwin RN, BAN

## 2018-11-26 ENCOUNTER — PRENATAL OFFICE VISIT (OUTPATIENT)
Dept: OBGYN | Facility: OTHER | Age: 28
End: 2018-11-26
Payer: COMMERCIAL

## 2018-11-26 ENCOUNTER — TELEPHONE (OUTPATIENT)
Dept: OBGYN | Facility: CLINIC | Age: 28
End: 2018-11-26

## 2018-11-26 VITALS
WEIGHT: 241.5 LBS | BODY MASS INDEX: 42.44 KG/M2 | DIASTOLIC BLOOD PRESSURE: 60 MMHG | SYSTOLIC BLOOD PRESSURE: 112 MMHG | HEART RATE: 74 BPM

## 2018-11-26 DIAGNOSIS — Z98.891 H/O: C-SECTION: ICD-10-CM

## 2018-11-26 DIAGNOSIS — Z30.2 ENCOUNTER FOR STERILIZATION: ICD-10-CM

## 2018-11-26 DIAGNOSIS — Z34.93 NORMAL PREGNANCY IN THIRD TRIMESTER: Primary | ICD-10-CM

## 2018-11-26 DIAGNOSIS — Z23 NEED FOR TDAP VACCINATION: ICD-10-CM

## 2018-11-26 PROCEDURE — 90715 TDAP VACCINE 7 YRS/> IM: CPT | Performed by: OBSTETRICS & GYNECOLOGY

## 2018-11-26 PROCEDURE — 90471 IMMUNIZATION ADMIN: CPT | Performed by: OBSTETRICS & GYNECOLOGY

## 2018-11-26 PROCEDURE — 99207 ZZC PRENATAL VISIT: CPT | Performed by: OBSTETRICS & GYNECOLOGY

## 2018-11-26 ASSESSMENT — PAIN SCALES - GENERAL: PAINLEVEL: NO PAIN (0)

## 2018-11-26 NOTE — PROGRESS NOTES
27 year old  at 28w2d weeks presents to the clinic for a routine prenatal visit.  No concerns.  No vaginal bleeding, leakage of fluid, or contractions   Good fetal movement.  Fundal height=32cm  S>D.  Will check a growth ultrasound   TZDf=755's  GCT=87  Hgb=12.2  Rh A+  RCS with TL=try for   Migraines=stable  RTC 2 weeks.  We discussed 3-1000 failure rate.  Increased risk of ectopic pregnancy if patient were to get pregnant again.  Risk of damage to nearby organs, bleeding, infection, and anesthesia risks.  Tubal consent signed today      Violette Baltazar

## 2018-11-26 NOTE — MR AVS SNAPSHOT
After Visit Summary   2018    Scotty Garber    MRN: [id number]           Patient Information     Date Of Birth          1990        Visit Information        Provider Department      2018 9:00 AM Violette Baltazar DO [facility]        Today's Diagnoses     Normal pregnancy in third trimester    -  1    Encounter for sterilization        Need for Tdap vaccination        H/O:           Care Instructions    What to watch out for are: regular contractions every 5 min, vaginal bleeding, decreased fetal movement, or leakage of fluid.  Please call the office or go to L&D if you develop any of these signs and symptoms.      I will see you for your follow up appointment.  Please feel free to call if you have any questions or concerns.      Thanks,  Violette Baltazar, DO            Follow-ups after your visit        Follow-up notes from your care team     Return in about 2 weeks (around 12/10/2018).      Your next 10 appointments already scheduled     Dec 10, 2018  9:30 AM CST   ESTABLISHED PRENATAL with Genevieve Gann MD   [facility]    [address] [facility] [address]   [phone]            Dec 31, 2018  9:00 AM CST   ESTABLISHED PRENATAL with Violette Baltazar DO   [facility]    [address] [facility] [address]   [phone]            2019  9:00 AM CST   ESTABLISHED PRENATAL with Violette Baltazar DO   [facility]    [address] [facility] [address]   [phone]            2019  9:00 AM CST   ESTABLISHED PRENATAL with Genevieve Gann MD   [facility]    [address] [facility] [address]   [phone]            2019  9:00 AM CST   ESTABLISHED PRENATAL with Genevieve Gann MD   [facility]  ([facility])    [address] [facility] [address]   [phone]            Jan 28, 2019  9:00 AM CST   ESTABLISHED PRENATAL with Violette Tarsha Eliseoil,    [facility]    [address] [facility] [address]   [phone]            Feb 04, 2019  9:00 AM CST   ESTABLISHED PRENATAL with Violette Baltazar,    [facility])    [address] [facility] [address]   [phone]              Future tests that were ordered for you today     Open Future Orders        Priority Expected Expires Ordered    US OB >14 Weeks Follow Up Routine  2/24/2019 11/26/2018            Who to contact     If you have questions or need follow up information about today's clinic visit or your schedule please contact [facility] directly at [phone].  Normal or non-critical lab and imaging results will be communicated to you by Diassesshart, letter or phone within 4 business days after the clinic has received the results. If you do not hear from us within 7 days, please contact the clinic through SeeMedia or phone. If you have a critical or abnormal lab result, we will notify you by phone as soon as possible.  Submit refill requests through SeeMedia or call your pharmacy and they will forward the refill request to us. Please allow 3 business days for your refill to be completed.          Additional Information About Your Visit        SeeMedia Information     SeeMedia gives you secure access to your electronic health record. If you see a primary care provider, you can also send messages to your care team and make appointments. If you have questions, please call your primary care clinic.  If you do not have a primary care provider, please call [phone] and they will assist you.        Care EveryWhere ID     This is your Care EveryWhere ID. This could be used by other organizations to access your [facility]  records  [id number]        Your Vitals Were     Pulse Last Period BMI (Body Mass Index)             74 05/12/2018 (Exact Date) 42.44 kg/m2          Blood Pressure from Last 3 Encounters:   11/26/18 112/60   11/09/18 118/60   10/02/18 123/67    Weight from Last 3 Encounters:   11/26/18 241 lb 8 oz (109.5 kg)   11/09/18 235 lb 8 oz (106.8 kg)   10/02/18 226 lb (102.5 kg)              We Performed the Following     ADMIN 1st VACCINE     Daisy-Operative Worksheet     TDAP, IM (10 - 64 YRS) - Adacel        Primary Care Provider Office Phone # Fax #    Va Elodia Petit, JEFF [phone] [address] [place] [address]        Equal Access to Services     [facility]: Hadii spring robledo hadasho Soomaali, waaxda luqadaha, qaybta kaalmada adeegyada, latha astudillo . So [facility] [phone].    ATENCIÓN: Si habla español, tiene a nixon disposición servicios gratuitos de asistencia lingüística. Suzette al [phone].    We comply with applicable federal civil rights laws and [place] laws. We do not discriminate on the basis of race, color, national origin, age, disability, sex, sexual orientation, or gender identity.            Thank you!     Thank you for choosing [facility]  for your care. Our goal is always to provide you with excellent care. Hearing back from our patients is one way we can continue to improve our services. Please take a few minutes to complete the written survey that you may receive in the mail after your visit with us. Thank you!             Your Updated Medication List - Protect others around you: Learn how to safely use, store and throw away your medicines at www.disposemymeds.org.          This list is accurate as of 11/26/18  9:36 AM.  Always use your most recent med list.                   Brand Name Dispense Instructions for use Diagnosis    PRENATAL 1 PO

## 2018-11-26 NOTE — TELEPHONE ENCOUNTER
Surgery Scheduled.    Date of Surgery 2/11/19 Time of Surgery 9:30a  Procedure: Repeat c section with tubal ligation  Hospital/Surgical Facility: [facility]  Surgeon: Dr. Baltazar  Type of Anesthesia Anticipated: Spinal  Pre-op: at OV visit with Dr. Baltazar at    6 week post op 3/29/19 with Giovanny at 4:45p  Pre-certification 11.26.18  Consent signed: NA  Hospital Stay yes     Surgery Pre-Certification    Medical Record Number: [id number]  Scotty Garber  YOB: 1990   Phone: [phone] (home) none (work)  Primary Provider: Va Petit    Reason for Admit:  History of 2 c sections, family status complete, infertility desired    Surgeon: Dr. Baltazar  Surgical Procedure: Repeat c section with tubal ligation  ICD-9 Coded: Z34.93, Z30.2,Z23,Z98.891  Date of Surgery: 2/11/19  Consent signed? N/A    Date signed:   Hospital: [facility]  Inpatient- Length of stay:  3 days.    Requestor:  Ashley Rivera     Location:  [facility]      Mailed surgery packet mailed to patient's home address.  Patient instructed NPO 12 hours prior to surgery, arrive 1 3/4  hours prior to surgery, Does not  have a .  Patient understood and agrees to the plan.     Ashley Rivera  Women's Health

## 2018-11-26 NOTE — TELEPHONE ENCOUNTER
Associated Diagnoses      H/O:            Comments      Body mass index is 42.44 kg/(m^2).          Order Questions      Question Answer Comment     Procedure name(s) - multi select Repeat c section with tubal ligation      Is this a multi surgeon case? No      Laterality N/A      Reason for procedure History of 2 c sections, family status complete, infertility desired      Location of Case: [facility]      Surgeon Procedure Time (incision to closure) in minutes (per procedure as applicable) 60      Note:  Surgical Case Time Needed (in minutes)     Date of Surgery (Requested): 2019      Patient Class (for admit prior to surgery, specify number of days in comments): Surgery admit      Length of Stay: 3 days      Anesthesia Spinal      Vendor Needed? No

## 2018-11-26 NOTE — PROGRESS NOTES
Screening Questionnaire for Adult Immunization    Are you sick today?   No   Do you have allergies to medications, food, a vaccine component or latex?   No   Have you ever had a serious reaction after receiving a vaccination?   No   Do you have a long-term health problem with heart disease, lung disease, asthma, kidney disease, metabolic disease (e.g. diabetes), anemia, or other blood disorder?   No   Do you have cancer, leukemia, HIV/AIDS, or any other immune system problem?   No   In the past 3 months, have you taken medications that affect  your immune system, such as prednisone, other steroids, or anticancer drugs; drugs for the treatment of rheumatoid arthritis, Crohn s disease, or psoriasis; or have you had radiation treatments?   No   Have you had a seizure, or a brain or other nervous system problem?   No   During the past year, have you received a transfusion of blood or blood     products, or been given immune (gamma) globulin or antiviral drug?   No   For women: Are you pregnant or is there a chance you could become        pregnant during the next month?   Yes   Have you received any vaccinations in the past 4 weeks?   No     Immunization questionnaire was positive for at least one answer.  Notified  .        Per orders of Dr. Baltazar , injection of TDAP  given by Parviz Alaniz. Patient instructed to remain in clinic for 15 minutes afterwards, and to report any adverse reaction to me immediately.  Due to injection administration, patient instructed to remain in clinic for 15 minutes  afterwards, and to report any adverse reaction to me immediately.      Screening performed by Parviz Alaniz on 11/26/2018 at 9:13 AM.

## 2018-11-28 ENCOUNTER — RADIANT APPOINTMENT (OUTPATIENT)
Dept: ULTRASOUND IMAGING | Facility: OTHER | Age: 28
End: 2018-11-28
Attending: OBSTETRICS & GYNECOLOGY
Payer: COMMERCIAL

## 2018-11-28 DIAGNOSIS — Z34.93 NORMAL PREGNANCY IN THIRD TRIMESTER: ICD-10-CM

## 2018-11-28 PROCEDURE — 76816 OB US FOLLOW-UP PER FETUS: CPT

## 2018-12-13 ENCOUNTER — PRENATAL OFFICE VISIT (OUTPATIENT)
Dept: OBGYN | Facility: OTHER | Age: 28
End: 2018-12-13
Payer: COMMERCIAL

## 2018-12-13 VITALS
SYSTOLIC BLOOD PRESSURE: 110 MMHG | BODY MASS INDEX: 43.36 KG/M2 | DIASTOLIC BLOOD PRESSURE: 60 MMHG | WEIGHT: 246.75 LBS | HEART RATE: 80 BPM

## 2018-12-13 DIAGNOSIS — Z98.891 H/O: C-SECTION: ICD-10-CM

## 2018-12-13 DIAGNOSIS — Z34.93 NORMAL PREGNANCY IN THIRD TRIMESTER: Primary | ICD-10-CM

## 2018-12-13 PROCEDURE — 99207 ZZC PRENATAL VISIT: CPT | Performed by: OBSTETRICS & GYNECOLOGY

## 2018-12-13 NOTE — PROGRESS NOTES
Presents for routine  appointment.    Right upper quadrant pain and left lower quadrant.  This is intermittent and feels like an intense bruise.  She states this baby moves more than her other babies combined. 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  She has no dysuria.    BH intermittently.  Some pelvic pressure.   No LOF/VB  ROS:   and GI negative.     Please see Prenatal Vitals and Notes Flowsheet for objective data.    US 18:  EFW 1553 gm, 82%    A/P:  27 year old  at 30w5d       ICD-10-CM    1. Normal pregnancy in third trimester Z34.93    2. H/O:  Z98.891        Discussed right upper quadrant pain, which is low and not in the area of the liver.  The pain is along the fetal lie.  No clinical evidence of  labor or abruption at this time.  Recommend observation and to go to L&D if her symptoms worsen or do not improve.    Repeat CS with tubal scheduled with Dr. Baltazar 19 at 9:30  Migraines=stable  Rhogam: not needed  Follow up in 2 weeks.      Genevieve Gann MD

## 2018-12-13 NOTE — NURSING NOTE
"Chief Complaint   Patient presents with     Prenatal Care       Initial /60 (BP Location: Left arm, Patient Position: Chair, Cuff Size: Adult Regular)   Pulse 80   Wt 111.9 kg (246 lb 12 oz)   LMP 2018 (Exact Date)   BMI 43.36 kg/m   Estimated body mass index is 43.36 kg/m  as calculated from the following:    Height as of 18: 1.607 m (5' 3.25\").    Weight as of this encounter: 111.9 kg (246 lb 12 oz).  BP completed using cuff size: regular        Jesenia Portillo, [facility]  2018          "

## 2018-12-31 ENCOUNTER — PRENATAL OFFICE VISIT (OUTPATIENT)
Dept: OBGYN | Facility: OTHER | Age: 28
End: 2018-12-31
Payer: COMMERCIAL

## 2018-12-31 ENCOUNTER — ANCILLARY PROCEDURE (OUTPATIENT)
Dept: ULTRASOUND IMAGING | Facility: OTHER | Age: 28
End: 2018-12-31
Attending: OBSTETRICS & GYNECOLOGY
Payer: COMMERCIAL

## 2018-12-31 VITALS
DIASTOLIC BLOOD PRESSURE: 70 MMHG | WEIGHT: 250 LBS | HEART RATE: 90 BPM | SYSTOLIC BLOOD PRESSURE: 120 MMHG | BODY MASS INDEX: 43.94 KG/M2

## 2018-12-31 DIAGNOSIS — Z30.2 ENCOUNTER FOR STERILIZATION: ICD-10-CM

## 2018-12-31 DIAGNOSIS — Z34.92 NORMAL PREGNANCY IN SECOND TRIMESTER: ICD-10-CM

## 2018-12-31 DIAGNOSIS — Z98.891 HISTORY OF 2 CESAREAN SECTIONS: ICD-10-CM

## 2018-12-31 DIAGNOSIS — Z34.92 NORMAL PREGNANCY IN SECOND TRIMESTER: Primary | ICD-10-CM

## 2018-12-31 PROCEDURE — 99207 ZZC PRENATAL VISIT: CPT | Performed by: OBSTETRICS & GYNECOLOGY

## 2018-12-31 PROCEDURE — 76816 OB US FOLLOW-UP PER FETUS: CPT

## 2018-12-31 PROCEDURE — 59425 ANTEPARTUM CARE ONLY: CPT | Performed by: OBSTETRICS & GYNECOLOGY

## 2018-12-31 NOTE — NURSING NOTE
"Chief Complaint   Patient presents with     Prenatal Care       Initial /70 (BP Location: Right arm, Patient Position: Chair, Cuff Size: Adult Regular)   Pulse 90   Wt 113.4 kg (250 lb)   LMP 2018 (Exact Date)   BMI 43.94 kg/m   Estimated body mass index is 43.94 kg/m  as calculated from the following:    Height as of 18: 1.607 m (5' 3.25\").    Weight as of this encounter: 113.4 kg (250 lb).  BP completed using cuff size: regular        Jesenia Portillo, [facility]  2018           "

## 2018-12-31 NOTE — PROGRESS NOTES
28 year old  at 33w2d weeks presents to the clinic for a routine prenatal visit.    Some abdominal pain near umbilicus.  Tolerable and minimal per patient.  Patient denies any vaginal bleeding, leakage of fluid, or contractions     Good fetal movement  Fundal height=37cm  S>D  Will get a growth ultrasound   History of fetal macrosomia  MHXg=007  RCS with TL=  Discussed labor precautions.  RTC 2 weeks    Violette Baltazar

## 2019-01-14 ENCOUNTER — PRENATAL OFFICE VISIT (OUTPATIENT)
Dept: OBGYN | Facility: OTHER | Age: 29
End: 2019-01-14
Payer: COMMERCIAL

## 2019-01-14 VITALS
DIASTOLIC BLOOD PRESSURE: 60 MMHG | HEART RATE: 74 BPM | SYSTOLIC BLOOD PRESSURE: 120 MMHG | WEIGHT: 252.25 LBS | BODY MASS INDEX: 44.33 KG/M2

## 2019-01-14 DIAGNOSIS — Z98.891 HISTORY OF 2 CESAREAN SECTIONS: Primary | ICD-10-CM

## 2019-01-14 DIAGNOSIS — Z30.2 ENCOUNTER FOR STERILIZATION: ICD-10-CM

## 2019-01-14 PROCEDURE — 99207 ZZC COMPLICATED OB VISIT: CPT | Performed by: OBSTETRICS & GYNECOLOGY

## 2019-01-14 NOTE — PROGRESS NOTES
Presents for routine  appointment.     No new concerns.    No LOF/VB/Ctxs.    ROS:   and GI  negative.     Please see Prenatal Vitals and Notes Flowsheet for objective data.    US 18:  EFW 2725 gm, 96% at 33w 2d.       A/P:  28 year old  at 35w2d       ICD-10-CM    1. History of 2  sections Z98.891    2. Encounter for sterilization Z30.2        LGA - consider repeat US for growth at 37 wks.  Normal GCT.  Total weight gain: 16.4 kg (36 lb 4 oz) .  First baby was 11 lbs 12 oz at 40w 5d.  Normal DALILA  Group B Strep next visit  RCS with TL=  Discussed labor and what to expect. Discussed when to go to the birth center.  Follow up in 1 week.      Genevieve Gann MD

## 2019-01-14 NOTE — NURSING NOTE
"Chief Complaint   Patient presents with     Prenatal Care       Initial /60 (BP Location: Right arm, Patient Position: Chair, Cuff Size: Adult Regular)   Pulse 74   Wt 114.4 kg (252 lb 4 oz)   LMP 2018 (Exact Date)   BMI 44.33 kg/m   Estimated body mass index is 44.33 kg/m  as calculated from the following:    Height as of 18: 1.607 m (5' 3.25\").    Weight as of this encounter: 114.4 kg (252 lb 4 oz).  BP completed using cuff size: regular          Jesenia Portillo, [facility]  2019        "

## 2019-01-14 NOTE — TELEPHONE ENCOUNTER
There has been an insurance change for patient since 1/1/19    Ashley Rivera  Women's [facility]

## 2019-01-19 ENCOUNTER — NURSE TRIAGE (OUTPATIENT)
Dept: NURSING | Facility: CLINIC | Age: 29
End: 2019-01-19

## 2019-01-19 NOTE — TELEPHONE ENCOUNTER
Scotty is 37 weeks pregnant.  She is running low grade fever and has developed both vomiting and diarrhea.  Also wide spread body aches/pains.  She is scheduled for c -section on 2/11/19.  Paged on call provider for [employer] [facility] for 2nd level triage due to being 37 weeks pregnant. Dr. Bonds is on call, page sent @ 7:11 am via smart web.    Caller advised to call back if they have not heard back from on call provider within 20 minutes.  Caller appears to understand directives and agrees with plan.    Em Santana, RN  [facility]

## 2019-01-24 ENCOUNTER — PRENATAL OFFICE VISIT (OUTPATIENT)
Dept: OBGYN | Facility: OTHER | Age: 29
End: 2019-01-24
Payer: COMMERCIAL

## 2019-01-24 VITALS
SYSTOLIC BLOOD PRESSURE: 130 MMHG | HEART RATE: 88 BPM | WEIGHT: 255.25 LBS | DIASTOLIC BLOOD PRESSURE: 70 MMHG | BODY MASS INDEX: 44.86 KG/M2

## 2019-01-24 DIAGNOSIS — Z98.891 HISTORY OF 2 CESAREAN SECTIONS: Primary | ICD-10-CM

## 2019-01-24 DIAGNOSIS — Z30.2 ENCOUNTER FOR STERILIZATION: ICD-10-CM

## 2019-01-24 PROCEDURE — 99207 ZZC COMPLICATED OB VISIT: CPT | Performed by: OBSTETRICS & GYNECOLOGY

## 2019-01-24 PROCEDURE — 87653 STREP B DNA AMP PROBE: CPT | Performed by: OBSTETRICS & GYNECOLOGY

## 2019-01-24 ASSESSMENT — PATIENT HEALTH QUESTIONNAIRE - PHQ9
10. IF YOU CHECKED OFF ANY PROBLEMS, HOW DIFFICULT HAVE THESE PROBLEMS MADE IT FOR YOU TO DO YOUR WORK, TAKE CARE OF THINGS AT HOME, OR GET ALONG WITH OTHER PEOPLE: NOT DIFFICULT AT ALL
SUM OF ALL RESPONSES TO PHQ QUESTIONS 1-9: 8
SUM OF ALL RESPONSES TO PHQ QUESTIONS 1-9: 8

## 2019-01-24 NOTE — TELEPHONE ENCOUNTER
Wanting to verify the insurance is covering  for upcoming surgery in February.    Ashley Rivera  Women's Health

## 2019-01-24 NOTE — NURSING NOTE
"Chief Complaint   Patient presents with     Prenatal Care       Initial /70 (BP Location: Left arm, Patient Position: Chair, Cuff Size: Adult Regular)   Pulse 88   Wt 115.8 kg (255 lb 4 oz)   LMP 2018 (Exact Date)   BMI 44.86 kg/m   Estimated body mass index is 44.86 kg/m  as calculated from the following:    Height as of 18: 1.607 m (5' 3.25\").    Weight as of this encounter: 115.8 kg (255 lb 4 oz).  BP completed using cuff size: regular        PHQ-9 score:    PHQ-9 SCORE 2019   PHQ-9 Total Score -   PHQ-9 Total Score MyChart 8 (Mild depression)   PHQ-9 Total Score 8       Jesenia Portillo [facility]  2019          "

## 2019-01-24 NOTE — PROGRESS NOTES
Presents for routine  appointment.     No complaints, but ready to be done.    She had the stomach flu last weekend, but is betternow  No LOF/VB/Ctxs.    ROS:   and GI  negative.     Please see Prenatal Vitals and Notes Flowsheet for objective data.    A/P:  28 year old  at 36w5d       ICD-10-CM    1. History of 2  sections Z98.891 Strep, Group B by PCR   2. Encounter for sterilization Z30.2        Group B Strep collected today  LGA - discussed US.  Would like to hold off on that for now but may discuss it with Dr. Baltazar.  Normal GCT.  First baby was 11 lbs 12 oz at 40w 5d.  Preop needed.  Plan to do it at a future visit. RCS with TL=  Discussed labor and what to expect. Discussed when to go to the birth center.  Follow up in 1 week.      Genevieve Gann MD         Answers for HPI/ROS submitted by the patient on 2019   If you checked off any problems, how difficult have these problems made it for you to do your work, take care of things at home, or get along with other people?: Not difficult at all  PHQ9 TOTAL SCORE: 8

## 2019-01-25 LAB
GP B STREP DNA SPEC QL NAA+PROBE: NEGATIVE
SPECIMEN SOURCE: NORMAL

## 2019-01-25 ASSESSMENT — PATIENT HEALTH QUESTIONNAIRE - PHQ9: SUM OF ALL RESPONSES TO PHQ QUESTIONS 1-9: 8

## 2019-01-28 ENCOUNTER — PRENATAL OFFICE VISIT (OUTPATIENT)
Dept: OBGYN | Facility: OTHER | Age: 29
End: 2019-01-28
Payer: COMMERCIAL

## 2019-01-28 VITALS
BODY MASS INDEX: 44.99 KG/M2 | DIASTOLIC BLOOD PRESSURE: 62 MMHG | WEIGHT: 256 LBS | SYSTOLIC BLOOD PRESSURE: 100 MMHG | HEART RATE: 96 BPM

## 2019-01-28 DIAGNOSIS — Z98.891 HISTORY OF 2 CESAREAN SECTIONS: ICD-10-CM

## 2019-01-28 DIAGNOSIS — Z34.93 NORMAL PREGNANCY IN THIRD TRIMESTER: Primary | ICD-10-CM

## 2019-01-28 DIAGNOSIS — Z30.2 ENCOUNTER FOR STERILIZATION: ICD-10-CM

## 2019-01-28 PROCEDURE — 99207 ZZC PRENATAL VISIT: CPT | Performed by: OBSTETRICS & GYNECOLOGY

## 2019-01-28 NOTE — PROGRESS NOTES
28 year old  at 37w2d weeks presents to the clinic for a routine prenatal visit.  No concerns.  Complains of feeling more pressure.  No vaginal bleeding, leakage of fluid, or contractions   Fundal height=41cm  ZCCb=655  CX=deffered  Discussed labor precautions  RTC 1 week  RCS with TL=2  Migraines=stable  GBS=Negative    Violette Baltazar

## 2019-02-04 ENCOUNTER — PRENATAL OFFICE VISIT (OUTPATIENT)
Dept: OBGYN | Facility: OTHER | Age: 29
End: 2019-02-04
Payer: COMMERCIAL

## 2019-02-04 VITALS
DIASTOLIC BLOOD PRESSURE: 68 MMHG | HEART RATE: 100 BPM | WEIGHT: 259 LBS | SYSTOLIC BLOOD PRESSURE: 118 MMHG | BODY MASS INDEX: 45.52 KG/M2

## 2019-02-04 DIAGNOSIS — Z30.2 ENCOUNTER FOR STERILIZATION: ICD-10-CM

## 2019-02-04 DIAGNOSIS — Z34.93 NORMAL PREGNANCY IN THIRD TRIMESTER: Primary | ICD-10-CM

## 2019-02-04 DIAGNOSIS — Z98.891 HISTORY OF 2 CESAREAN SECTIONS: ICD-10-CM

## 2019-02-04 PROCEDURE — 99207 ZZC PRENATAL VISIT: CPT | Performed by: OBSTETRICS & GYNECOLOGY

## 2019-02-04 PROCEDURE — 59425 ANTEPARTUM CARE ONLY: CPT | Performed by: OBSTETRICS & GYNECOLOGY

## 2019-02-04 NOTE — PROGRESS NOTES
28 year old  at 38w2d weeks presents to the clinic for a routine prenatal visit.  No concerns.  Complains of feeling more pressure.  No vaginal bleeding, leakage of fluid, or contractions   Fundal height=41cm  IWHi=766  CX=deferred   Discussed labor precautions  RCS with TL=  GBS=Negative      Date of Surgery:   Type of Anticipated Surgery: RCS with TL  Surgeon: Violette Baltazar  Type of Anesthesia Anticipated: Spinal                                SUBJECTIVE:  Scotty Garber is an 28 year old female here for Preoperative clearance for surgery from operating surgeon Violette Baltazar.    HPI: 29 y/o  at 38w2d presents for pre-op.  She is having a RCS with TL on .  Any Aspirin within 10 days? No  Transfusion reactions: No prior transfusions  Bleeding tendencies:No bleeding problems noted    Patient Active Problem List   Diagnosis     CARDIOVASCULAR SCREENING; LDL GOAL LESS THAN 160     Attention deficit disorder of adult     Migraines     Encounter for supervision of other normal pregnancy     History of postpartum depression     History of 2  sections     Encounter for sterilization     Past Medical History:   Diagnosis Date     Depression      History of macrosomia in infant in prior pregnancy, currently pregnant 2016     History of postpartum depression 2016      Current Outpatient Medications   Medication     Prenatal MV-Min-Fe Fum-FA-DHA (PRENATAL 1 PO)     No current facility-administered medications for this visit.      Allergies   Allergen Reactions     Nkda [No Known Drug Allergies]      Oxycodone Nausea and Vomiting     Seasonal Allergies      Past Surgical History:   Procedure Laterality Date      SECTION  2014     reconstructive knee surgery  1/15/2010     Family History   Problem Relation Age of Onset     Cancer Mother         cervical cancer     Depression Mother      Diabetes Maternal Grandmother      Hypertension Maternal Grandmother       Depression Paternal Grandmother      No family history of malignant hyperthermia.       REVIEW OF SYSTEMS:  General: negative  Skin: negative  Eyes: negative  Ears/Nose/Throat: negative  Respiratory: No shortness of breath, dyspnea on exertion, cough, or hemoptysis  Cardiovascular: negative  Gastrointestinal: negative  Genitourinary: negative  Musculoskeletal: negative  Neurologic: negative  Psychiatric: negative  Hematologic/Lymphatic/Immunologic: negative  Endocrine: negative       PHYSICAL EXAM:  General Appearance: healthy, alert and no distress  Head: Normocephalic. No masses, lesions, tenderness or abnormalities  Eyes: normal  Ears: negative  Nose: Nares normal  Mouth: Oropharynx normal  Heart:regular rate and rhythm and no murmurs, clicks, or gallops  Lungs: negative, Percussion normal. Good diaphragmatic excursion. Lungs clear  Abdomen: Abdomen soft, non-tender. BS normal. No masses, organomegaly  Genitals: Deferred  Extremities: Extremities normal. No deformities, edema, or skin discoloration.  Musculoskeletal: Spine ROM normal. Muscular strength intact.  Skin: Skin color, texture, turgor normal. No rashes or lesions.  Neurological: Gait normal. Reflexes normal and symmetric. Sensation grossly WNL.    Diabetic Instructions Given for Pre-Op Insulin: NOT APPLICABLE      ASSESSMENT:  Preoperative clearance for surgery.         PLAN:  Cleared for surgery: yes          MD Signature:     Violette Baltazar

## 2019-02-12 NOTE — TELEPHONE ENCOUNTER
Patient has preferred one insurance no PA required for [id number] inpatient notification will be required from [facility]

## 2019-02-16 ENCOUNTER — MYC MEDICAL ADVICE (OUTPATIENT)
Dept: FAMILY MEDICINE | Facility: OTHER | Age: 29
End: 2019-02-16

## 2019-02-18 NOTE — PROGRESS NOTES
SUBJECTIVE:   Scotty Garber is a 28 year old female who presents to clinic today for the following health issues:      HPI  URINARY TRACT SYMPTOMS  Onset: Since Thursday    Description:   Painful urination (Dysuria): YES, is not going more frequently of feeling the need to urinate again shortly after urinating.  Blood in urine (Hematuria): no   Delay in urine (Hesitency): YES- little    Intensity: mild    Progression of Symptoms:  same    Accompanying Signs & Symptoms:  Fever/chills: YES- chills  Flank pain YES  Nausea and vomiting: no   Any vaginal symptoms: none  Abdominal/Pelvic Pain: YES - could be from , pain in the lower back but not severe. No swelling or heaviness in the pelvis or abdomen    History:   History of frequent UTI's: no   History of kidney stones: no   Sexually Active: YES  Possibility of pregnancy: No    Precipitating factors:   None    Therapies Tried and outcome: Increase fluid intake    She is still bleeding some vaginally and thinks some blood may have gotten into the urine sample.     Problem list and histories reviewed & adjusted, as indicated.  Additional history: as documented    Patient Active Problem List   Diagnosis     CARDIOVASCULAR SCREENING; LDL GOAL LESS THAN 160     Attention deficit disorder of adult     Migraines     Encounter for supervision of other normal pregnancy     History of postpartum depression     History of 2  sections     Encounter for sterilization     Past Surgical History:   Procedure Laterality Date      SECTION       reconstructive knee surgery  1/15/2010       Social History     Tobacco Use     Smoking status: Never Smoker     Smokeless tobacco: Never Used   Substance Use Topics     Alcohol use: Yes     Comment: occasion-not in PG     Family History   Problem Relation Age of Onset     Cancer Mother         cervical cancer     Depression Mother      Diabetes Maternal Grandmother      Hypertension Maternal Grandmother       Depression Paternal Grandmother          Current Outpatient Medications   Medication Sig Dispense Refill     Prenatal MV-Min-Fe Fum-FA-DHA (PRENATAL 1 PO)        Allergies   Allergen Reactions     Nkda [No Known Drug Allergies]      Oxycodone Nausea and Vomiting     Seasonal Allergies      BP Readings from Last 3 Encounters:   02/19/19 118/74   02/04/19 118/68   01/28/19 100/62    Wt Readings from Last 3 Encounters:   02/19/19 105.4 kg (232 lb 4.8 oz)   02/04/19 117.5 kg (259 lb)   01/28/19 116.1 kg (256 lb)                  Labs reviewed in EPIC    ROS:  Constitutional, HEENT, cardiovascular, pulmonary, GI, , musculoskeletal, neuro, skin, endocrine and psych systems are negative, except as otherwise noted.    OBJECTIVE:     /74   Pulse 72   Temp 98  F (36.7  C) (Temporal)   Resp 16   Wt 105.4 kg (232 lb 4.8 oz)   LMP 05/12/2018 (Exact Date)   BMI 40.83 kg/m    Body mass index is 40.83 kg/m .  GENERAL: healthy, alert and no distress  ABDOMEN: soft, nontender, no hepatosplenomegaly, no masses and bowel sounds normal  MS: no gross musculoskeletal defects noted, no edema  BACK: no CVA tenderness, no paralumbar tenderness  PSYCH: mentation appears normal, affect normal/bright    Diagnostic Test Results:  Results for orders placed or performed in visit on 02/19/19 (from the past 24 hour(s))   *UA reflex to Microscopic and Culture ([place] and [facility] (except [place] and [place])   Result Value Ref Range    Color Urine Yellow     Appearance Urine Clear     Glucose Urine Negative NEG^Negative mg/dL    Bilirubin Urine Negative NEG^Negative    Ketones Urine Negative NEG^Negative mg/dL    Specific Gravity Urine 1.010 1.003 - 1.035    Blood Urine Large (A) NEG^Negative    pH Urine 6.5 5.0 - 7.0 pH    Protein Albumin Urine Negative NEG^Negative mg/dL    Urobilinogen Urine 0.2 0.2 - 1.0 EU/dL    Nitrite Urine Negative NEG^Negative    Leukocyte Esterase Urine Trace (A) NEG^Negative    Source Unspecified  Urine    Urine Microscopic   Result Value Ref Range    WBC Urine 0 - 5 OTO5^0 - 5 /HPF    RBC Urine 5-10 (A) OTO2^O - 2 /HPF    Squamous Epithelial /LPF Urine Few FEW^Few /LPF       ASSESSMENT/PLAN:     1. Dysuria  UA does not suggest infection. Hematuria sounds like it is likely from some vaginal blood that patient thinks may have contaminated sample. Recommend pushing fluids, emptying bladder frequently throughout the day. I did place a future order for UA if her symptoms persist or worsen so that she can come in to leave a urine sample. Asked her to send me a message if any questions or concerns.   - *UA reflex to Microscopic and Culture ([place] and [facility] (except [place] and [place])  - Urine Microscopic  - *UA reflex to Microscopic and Culture ([place] and [facility] (except [place] and [place]); Future    JEFF Levi [facility]

## 2019-02-18 NOTE — TELEPHONE ENCOUNTER
Responded via [employer] to confirm that appointment time will work for the patient  Thanks  Flower Pearson RT (R)

## 2019-02-19 ENCOUNTER — OFFICE VISIT (OUTPATIENT)
Dept: FAMILY MEDICINE | Facility: OTHER | Age: 29
End: 2019-02-19
Payer: COMMERCIAL

## 2019-02-19 VITALS
SYSTOLIC BLOOD PRESSURE: 118 MMHG | DIASTOLIC BLOOD PRESSURE: 74 MMHG | RESPIRATION RATE: 16 BRPM | BODY MASS INDEX: 40.83 KG/M2 | HEART RATE: 72 BPM | TEMPERATURE: 98 F | WEIGHT: 232.3 LBS

## 2019-02-19 DIAGNOSIS — R30.0 DYSURIA: Primary | ICD-10-CM

## 2019-02-19 LAB
ALBUMIN UR-MCNC: NEGATIVE MG/DL
APPEARANCE UR: CLEAR
BILIRUB UR QL STRIP: NEGATIVE
COLOR UR AUTO: YELLOW
GLUCOSE UR STRIP-MCNC: NEGATIVE MG/DL
HGB UR QL STRIP: ABNORMAL
KETONES UR STRIP-MCNC: NEGATIVE MG/DL
LEUKOCYTE ESTERASE UR QL STRIP: ABNORMAL
NITRATE UR QL: NEGATIVE
NON-SQ EPI CELLS #/AREA URNS LPF: ABNORMAL /LPF
PH UR STRIP: 6.5 PH (ref 5–7)
RBC #/AREA URNS AUTO: ABNORMAL /HPF
SOURCE: ABNORMAL
SP GR UR STRIP: 1.01 (ref 1–1.03)
UROBILINOGEN UR STRIP-ACNC: 0.2 EU/DL (ref 0.2–1)
WBC #/AREA URNS AUTO: ABNORMAL /HPF

## 2019-02-19 PROCEDURE — 81001 URINALYSIS AUTO W/SCOPE: CPT | Performed by: STUDENT IN AN ORGANIZED HEALTH CARE EDUCATION/TRAINING PROGRAM

## 2019-02-19 PROCEDURE — 99213 OFFICE O/P EST LOW 20 MIN: CPT | Performed by: STUDENT IN AN ORGANIZED HEALTH CARE EDUCATION/TRAINING PROGRAM

## 2019-02-19 NOTE — PATIENT INSTRUCTIONS
If your urinary symptoms persist or worsen, you can come into the lab to leave a urine sample.    Continue to push fluids and rest. Empty your bladder frequently.    TABITHA DanielsC

## 2019-02-28 ENCOUNTER — OFFICE VISIT (OUTPATIENT)
Dept: FAMILY MEDICINE | Facility: OTHER | Age: 29
End: 2019-02-28
Payer: COMMERCIAL

## 2019-02-28 ENCOUNTER — ANCILLARY PROCEDURE (OUTPATIENT)
Dept: GENERAL RADIOLOGY | Facility: OTHER | Age: 29
End: 2019-02-28
Attending: STUDENT IN AN ORGANIZED HEALTH CARE EDUCATION/TRAINING PROGRAM
Payer: COMMERCIAL

## 2019-02-28 VITALS
BODY MASS INDEX: 40.86 KG/M2 | SYSTOLIC BLOOD PRESSURE: 120 MMHG | HEART RATE: 80 BPM | HEIGHT: 63 IN | WEIGHT: 230.6 LBS | DIASTOLIC BLOOD PRESSURE: 100 MMHG | RESPIRATION RATE: 16 BRPM | TEMPERATURE: 97.2 F

## 2019-02-28 DIAGNOSIS — S69.92XA INJURY OF FINGER OF LEFT HAND, INITIAL ENCOUNTER: ICD-10-CM

## 2019-02-28 DIAGNOSIS — S62.661A CLOSED NONDISPLACED FRACTURE OF DISTAL PHALANX OF LEFT INDEX FINGER, INITIAL ENCOUNTER: Primary | ICD-10-CM

## 2019-02-28 PROCEDURE — 99213 OFFICE O/P EST LOW 20 MIN: CPT | Performed by: STUDENT IN AN ORGANIZED HEALTH CARE EDUCATION/TRAINING PROGRAM

## 2019-02-28 PROCEDURE — 73140 X-RAY EXAM OF FINGER(S): CPT | Mod: LT

## 2019-02-28 ASSESSMENT — PAIN SCALES - GENERAL: PAINLEVEL: WORST PAIN (10)

## 2019-02-28 ASSESSMENT — MIFFLIN-ST. JEOR: SCORE: 1744.08

## 2019-02-28 NOTE — PROGRESS NOTES
SUBJECTIVE:   Scotty Garber is a 28 year old female who presents to clinic today for the following health issues:      HPI  Joint Pain    Onset: 1 day    Description:   Location: left pointer finger  Character: Sharp    Intensity: 10/10    Progression of Symptoms: same    Accompanying Signs & Symptoms:  Other symptoms: numbness, tingling and swelling    History:   Previous similar pain: no history prior to this of trauma to hand or finger     Precipitating factors:   Trauma or overuse: YES- Fell going up the stairs and frozen roast fell on end of her left index finger. She had immediate pain and swelling and bruising followed.     Alleviating factors:  Improved by: rest/inactivity    Therapies Tried and outcome: Tylenol, ibuprofen - does not help with pain.      Problem list and histories reviewed & adjusted, as indicated.  Additional history: as documented    Patient Active Problem List   Diagnosis     CARDIOVASCULAR SCREENING; LDL GOAL LESS THAN 160     Attention deficit disorder of adult     Migraines     Encounter for supervision of other normal pregnancy     History of postpartum depression     History of 2  sections     Encounter for sterilization     Past Surgical History:   Procedure Laterality Date      SECTION       reconstructive knee surgery  1/15/2010       Social History     Tobacco Use     Smoking status: Never Smoker     Smokeless tobacco: Never Used   Substance Use Topics     Alcohol use: Yes     Comment: occasion-not in PG     Family History   Problem Relation Age of Onset     Cancer Mother         cervical cancer     Depression Mother      Diabetes Maternal Grandmother      Hypertension Maternal Grandmother      Depression Paternal Grandmother          Current Outpatient Medications   Medication Sig Dispense Refill     Prenatal MV-Min-Fe Fum-FA-DHA (PRENATAL 1 PO)        Allergies   Allergen Reactions     Nkda [No Known Drug Allergies]      Oxycodone Nausea and Vomiting  "    Seasonal Allergies      BP Readings from Last 3 Encounters:   02/28/19 (!) 120/100   02/19/19 118/74   02/04/19 118/68    Wt Readings from Last 3 Encounters:   02/28/19 104.6 kg (230 lb 9.6 oz)   02/19/19 105.4 kg (232 lb 4.8 oz)   02/04/19 117.5 kg (259 lb)                  Labs reviewed in EPIC    ROS:  Constitutional, HEENT, cardiovascular, pulmonary, gi and gu systems are negative, except as otherwise noted.    OBJECTIVE:     BP (!) 120/100 (BP Location: Left arm, Patient Position: Standing, Cuff Size: Adult Regular)   Pulse 80   Temp 97.2  F (36.2  C) (Temporal)   Resp 16   Ht 1.607 m (5' 3.25\")   Wt 104.6 kg (230 lb 9.6 oz)   LMP 05/12/2018 (Exact Date)   BMI 40.53 kg/m    Body mass index is 40.53 kg/m .  GENERAL: healthy, alert and no distress  MS: left distal index finger edematous with tenderness to palpation and decreased ROM  SKIN: ecchymosis of left index finger, no erythema or lacerations    Diagnostic Test Results:  LEFT FINGER TWO OR MORE VIEWS   2/28/2019 10:13 AM      HISTORY:  Frozen roast fell on index finger. Injury of finger of left  hand, initial encounter.     COMPARISON: None.                                                                      IMPRESSION: There is an oblique longitudinal fracture through the  ulnar aspect of the tuft of the index finger without significant  displacement. No other fractures or abnormalities are identified.      SANDY MAYNARD MD    ASSESSMENT/PLAN:     1. Closed nondisplaced fracture of distal phalanx of left index finger, initial encounter  Fracture of distal phalanx that is nondisplaced. Finger splint applied which she will need to wear this for 4 weeks. May remove for showering or to apply ice. Discussed need to protect finger when not in splint from impact or trauma particularly with having little children around as we do not want the fracture to become displaced. Tylenol for pain as she is breastfeeding. Follow up as needed in the " meantime.    2. Injury of finger of left hand, initial encounter  - XR Finger Left G/E 2 Views; Future    JEFF Levi [facility]

## 2019-02-28 NOTE — PATIENT INSTRUCTIONS
Patient Education     Finger Fracture, Closed  You have a broken finger (fracture). This causes local pain, swelling, and bruising. This injury usually takes about 4 to 6 weeks to heal, but can take longer in some cases. Finger injuries are often treated with a splint or cast, or by taping the injured finger to the next one (sean taping). This protects the injured finger and holds the bone in position while it heals. More serious fractures may need surgery.     If the fingernail has been severely injured, it will probably fall off in 1 to 2 weeks. A new fingernail will usually start to grow back within a month.  Home care  Follow these guidelines when caring for yourself at home:    Keep your hand elevated to reduce pain and swelling. When sitting or lying down keep your arm above the level of your heart. You can do this by placing your arm on a pillow that rests on your chest or on a pillow at your side. This is most important during the first 2 days (48 hours) after the injury.    Put an ice pack on the injured area. Do this for 20 minutes every 1 to 2 hours the first day for pain relief. You can make an ice pack by wrapping a plastic bag of ice cubes in a thin towel. As the ice melts, be careful that the cast or splint doesn t get wet. Continue using the ice pack 3 to 4 times a day until the pain and swelling go away.    Keep the cast or splint completely dry at all times. Bathe with your cast or splint out of the water. Protect it with a large plastic bag, rubber-banded at the top end. If a fiberglass cast or splint gets wet, you can dry it with a hair dryer.    If sean tape was put on and it becomes wet or dirty, change it. You may replace it with paper, plastic, or cloth tape. Cloth tape and paper tapes must be kept dry. Keep the sean tape in place for at least 4 weeks.    You may use acetaminophen or ibuprofen to control pain, unless another pain medicine was prescribed. If you have chronic liver or  kidney disease, talk with your healthcare provider before using these medicines. Also talk with your provider if you ve had a stomach ulcer or gastrointestinal bleeding.    Don t put creams or objects under the cast if you have itching.  Follow-up care  Follow up with your healthcare provider, or as advised. This is to make sure the bone is healing the way it should.  X-rays may be taken. You will be told of any new findings that may affect your care.  When to seek medical advice  Call your healthcare provider right away if any of these occur:    The plaster cast or splint becomes wet or soft    The cast or splint cracks    The fiberglass cast or splint stays wet for more than 24 hours    Pain or swelling gets worse    Redness, warmth, swelling, drainage from the wound, or foul odor from a cast or splint    Finger becomes more cold, blue, numb, or tingly    You can t move your finger    The skin around the cast or splint becomes red    Fever of 100.4 F (38 C) or higher, or as directed by your healthcare provider  Date Last Reviewed: 2/1/2017 2000-2018 [employer] [address] [place] [id number]. All rights reserved. This information is not intended as a substitute for professional medical care. Always follow your healthcare professional's instructions.

## 2019-04-11 ENCOUNTER — OFFICE VISIT (OUTPATIENT)
Dept: FAMILY MEDICINE | Facility: OTHER | Age: 29
End: 2019-04-11
Payer: COMMERCIAL

## 2019-04-11 VITALS
HEART RATE: 82 BPM | OXYGEN SATURATION: 98 % | SYSTOLIC BLOOD PRESSURE: 104 MMHG | TEMPERATURE: 97.3 F | WEIGHT: 228 LBS | HEIGHT: 64 IN | DIASTOLIC BLOOD PRESSURE: 74 MMHG | RESPIRATION RATE: 16 BRPM | BODY MASS INDEX: 38.93 KG/M2

## 2019-04-11 DIAGNOSIS — H65.92 OME (OTITIS MEDIA WITH EFFUSION), LEFT: Primary | ICD-10-CM

## 2019-04-11 PROCEDURE — 99213 OFFICE O/P EST LOW 20 MIN: CPT | Performed by: PHYSICIAN ASSISTANT

## 2019-04-11 RX ORDER — AMOXICILLIN 500 MG/1
500 CAPSULE ORAL 2 TIMES DAILY
Qty: 14 CAPSULE | Refills: 0 | Status: SHIPPED | OUTPATIENT
Start: 2019-04-11 | End: 2019-05-17

## 2019-04-11 ASSESSMENT — PAIN SCALES - GENERAL: PAINLEVEL: MODERATE PAIN (4)

## 2019-04-11 ASSESSMENT — MIFFLIN-ST. JEOR: SCORE: 1748.17

## 2019-04-11 NOTE — PROGRESS NOTES
SUBJECTIVE:   Scotty Garber is a 28 year old female who presents to clinic today for the following health issues:      HPI  Acute Illness   Acute illness concerns: left ear pain  Onset: 4 days    Fever: no     Chills/Sweats: no     Headache (location?): YES    Sinus Pressure:YES    Conjunctivitis:  no    Ear Pain: YES: left    Rhinorrhea: no     Congestion: no     Sore Throat: YES     Cough: no    Wheeze: no     Decreased Appetite: YES    Nausea: YES    Vomiting: no     Diarrhea:  YES    Dysuria/Freq.: no     Fatigue/Achiness: YES    Sick/Strep Exposure: YES     Therapies Tried and outcome: tylenol    Patient presents today with 4 days of worsening left ear pain. She has had a very mild sore throat. Some sinus pressure and headaches. No fevers. Kids with colds. History of many ear infections as a kid.     Additional history: as documented    Reviewed and updated as needed this visit by clinical staff    Reviewed and updated as needed this visit by Provider       Patient Active Problem List   Diagnosis     CARDIOVASCULAR SCREENING; LDL GOAL LESS THAN 160     Attention deficit disorder of adult     Migraines     Encounter for supervision of other normal pregnancy     History of postpartum depression     History of 2  sections     Encounter for sterilization     Past Surgical History:   Procedure Laterality Date      SECTION       reconstructive knee surgery  1/15/2010       Social History     Tobacco Use     Smoking status: Never Smoker     Smokeless tobacco: Never Used   Substance Use Topics     Alcohol use: Yes     Comment: occasion-not in PG     Family History   Problem Relation Age of Onset     Cancer Mother         cervical cancer     Depression Mother      Diabetes Maternal Grandmother      Hypertension Maternal Grandmother      Depression Paternal Grandmother          Current Outpatient Medications   Medication Sig Dispense Refill     amoxicillin (AMOXIL) 500 MG capsule Take 1 capsule  "(500 mg) by mouth 2 times daily for 7 days 14 capsule 0     Prenatal MV-Min-Fe Fum-FA-DHA (PRENATAL 1 PO)        Allergies   Allergen Reactions     Oxycodone Nausea and Vomiting     Seasonal Allergies      BP Readings from Last 3 Encounters:   04/11/19 104/74   02/28/19 (!) 120/100   02/19/19 118/74    Wt Readings from Last 3 Encounters:   04/11/19 103.4 kg (228 lb)   02/28/19 104.6 kg (230 lb 9.6 oz)   02/19/19 105.4 kg (232 lb 4.8 oz)         ROS:  Constitutional, HEENT, cardiovascular, pulmonary, gi and gu systems are negative, except as otherwise noted.    OBJECTIVE:     /74   Pulse 82   Temp 97.3  F (36.3  C) (Temporal)   Resp 16   Ht 1.632 m (5' 4.25\")   Wt 103.4 kg (228 lb)   LMP 05/12/2018 (LMP Unknown)   SpO2 98%   Breastfeeding? Yes   BMI 38.83 kg/m    Body mass index is 38.83 kg/m .  GENERAL: healthy, alert and no distress  EYES: Eyes grossly normal to inspection, PERRL and conjunctivae and sclerae normal  HENT: normal cephalic/atraumatic, left ear: erythematous, bulging membrane and mucopurulent effusion, nose and mouth without ulcers or lesions, oropharynx clear and oral mucous membranes moist  NECK: no adenopathy, no asymmetry, masses, or scars and thyroid normal to palpation  RESP: lungs clear to auscultation - no rales, rhonchi or wheezes  CV: regular rate and rhythm, normal S1 S2, no S3 or S4, no murmur, click or rub, no peripheral edema and peripheral pulses strong  SKIN: no suspicious lesions or rashes  PSYCH: mentation appears normal, affect normal/bright    Diagnostic Test Results:  none     ASSESSMENT/PLAN:     1. OME (otitis media with effusion), left  Amoxicillin started as below. Encouraged continuation of supportive cares. Patient will follow-up in clinic if new symptoms develop or current symptoms fail to improve.  - amoxicillin (AMOXIL) 500 MG capsule; Take 1 capsule (500 mg) by mouth 2 times daily for 7 days  Dispense: 14 capsule; Refill: 0    The patient indicates " understanding of these issues and agrees with the plan.    Violeta Cavazos PA-C  [facility]

## 2019-04-15 NOTE — PROGRESS NOTES
SUBJECTIVE:   Scotty Garber is a 28 year old female who presents to clinic today for the following health issues:      HPI  Acute Illness - Ear pain follow up   Acute illness concerns: Left ear pain  Onset: 9 days    Fever: no    Chills/Sweats: no    Headache (location?): YES- top of head    Sinus Pressure:YES    Conjunctivitis:  no    Ear Pain: YES-  Left - fluid in ear    Rhinorrhea: no    Congestion: no    Sore Throat: YES- little     Cough: no    Wheeze: no    Decreased Appetite: no    Nausea: YES- little    Vomiting: no    Diarrhea:  no    Dysuria/Freq.: no    Fatigue/Achiness: YES    Sick/Strep Exposure: YES     Therapies Tried and outcome: Tylenol, amoxicillin      Additional history: as documented    Reviewed and updated as needed this visit by clinical staff         Reviewed and updated as needed this visit by Provider         Patient Active Problem List   Diagnosis     CARDIOVASCULAR SCREENING; LDL GOAL LESS THAN 160     Attention deficit disorder of adult     Migraines     Encounter for supervision of other normal pregnancy     History of postpartum depression     History of 2  sections     Encounter for sterilization     Past Surgical History:   Procedure Laterality Date      SECTION       reconstructive knee surgery  1/15/2010       Social History     Tobacco Use     Smoking status: Never Smoker     Smokeless tobacco: Never Used   Substance Use Topics     Alcohol use: Yes     Comment: occasion-not in PG     Family History   Problem Relation Age of Onset     Cancer Mother         cervical cancer     Depression Mother      Diabetes Maternal Grandmother      Hypertension Maternal Grandmother      Depression Paternal Grandmother          Current Outpatient Medications   Medication Sig Dispense Refill     amoxicillin (AMOXIL) 500 MG capsule Take 1 capsule (500 mg) by mouth 2 times daily for 7 days 14 capsule 0     Prenatal MV-Min-Fe Fum-FA-DHA (PRENATAL 1 PO)        Allergies  "  Allergen Reactions     Oxycodone Nausea and Vomiting     Seasonal Allergies      BP Readings from Last 3 Encounters:   04/16/19 104/70   04/11/19 104/74   02/28/19 (!) 120/100    Wt Readings from Last 3 Encounters:   04/16/19 101.9 kg (224 lb 9.6 oz)   04/11/19 103.4 kg (228 lb)   02/28/19 104.6 kg (230 lb 9.6 oz)                  Labs reviewed in EPIC    ROS:  Constitutional, HEENT, cardiovascular, pulmonary, gi and gu systems are negative, except as otherwise noted.    OBJECTIVE:     /70   Pulse 68   Temp 97.3  F (36.3  C) (Temporal)   Resp 16   Ht 1.632 m (5' 4.25\")   Wt 101.9 kg (224 lb 9.6 oz)   LMP 05/12/2018 (LMP Unknown)   BMI 38.25 kg/m    Body mass index is 38.25 kg/m .  GENERAL: healthy, alert and no distress  EYES: Eyes grossly normal to inspection, PERRL and conjunctivae and sclerae normal  HENT: right ear canal and TM normal, left TM with mild erythema at base and clear effusion, nose and mouth without ulcers or lesions  NECK: no adenopathy, no asymmetry, masses, or scars  RESP: lungs clear to auscultation - no rales, rhonchi or wheezes  CV: regular rate and rhythm, normal S1 S2, no S3 or S4, no murmur, click or run    Diagnostic Test Results:  none     ASSESSMENT/PLAN:     1. OME (otitis media with effusion), left  Recommend patient continue and complete course of amoxicillin.  She does have symptoms of mild sinusitis and we discussed that she may need a stronger antibiotic if her symptoms persist or worsen but first we will give it time to see if they improve on their own.  Patient agrees with plan.      JEFF Levi [facility]  "

## 2019-04-16 ENCOUNTER — OFFICE VISIT (OUTPATIENT)
Dept: FAMILY MEDICINE | Facility: OTHER | Age: 29
End: 2019-04-16
Payer: COMMERCIAL

## 2019-04-16 VITALS
BODY MASS INDEX: 38.35 KG/M2 | DIASTOLIC BLOOD PRESSURE: 70 MMHG | HEIGHT: 64 IN | WEIGHT: 224.6 LBS | TEMPERATURE: 97.3 F | SYSTOLIC BLOOD PRESSURE: 104 MMHG | HEART RATE: 68 BPM | RESPIRATION RATE: 16 BRPM

## 2019-04-16 DIAGNOSIS — H65.92 OME (OTITIS MEDIA WITH EFFUSION), LEFT: Primary | ICD-10-CM

## 2019-04-16 PROCEDURE — 99213 OFFICE O/P EST LOW 20 MIN: CPT | Performed by: STUDENT IN AN ORGANIZED HEALTH CARE EDUCATION/TRAINING PROGRAM

## 2019-04-16 ASSESSMENT — MIFFLIN-ST. JEOR: SCORE: 1732.78

## 2019-05-14 NOTE — PROGRESS NOTES
SUBJECTIVE:   Scotty Garber is a 28 year old female who presents to clinic today for the following health issues:      HPI  Headache  Onset: 1.5 month    Description:   Location: back left side of head   Character: gradual, feels like pins and needles  Frequency:  3-4 times a week, no light sensitive, all day if starts in the morning,   Change diet dramatically, stopped eating sugars, 7 weeks ago  Duration:  All day  Ear - no longer having swishy feeling    Intensity: moderate    Progression of Symptoms:  same    Accompanying Signs & Symptoms:  Stiff neck: YES  Neck or upper back pain: no  Fever: no  Sinus pressure: no  Nausea or vomiting: YES - nausea with the headaches  Dizziness: YES with the headaches  Numbness: no  Weakness: YES  Visual changes: no    History:   Head trauma: YES - concussions in high school  Family history of migraines: no  Previous tests for headaches: YES- in high school,   Neurologist evaluations: no  Able to do daily activities: YES  Wake with a headaches: YES  Do headaches wake you up: no  Daily pain medication use: no  Work/school stressors/changes: no    Precipitating factors:   Does light make it worse: no  Does sound make it worse: no    Alleviating factors:  Does sleep help: YES    Therapies Tried and outcome: Ibuprofen (Advil, Motrin) and Tylenol  She is here for evaluation of headaches on the left side of her head.  She was treated for left ear infection on April 11.  The symptoms have resolved.  The headaches are 3-4 times a week and if she wakes up in the morning with the headache it will persist throughout the day.  Sometimes the headaches will start during the middle of the day and persist through evening.  She has some discomfort in her left neck and shoulder.  She has an infant who she carries with her left arm and propped against her left chest and shoulder throughout much of the day.  She has large breasts and she has been wearing a bra for breast-feeding that is not  "supportive.  She has no light sensitivity or noise sensitivity with the headaches.  She has a past history of migraines and these are different than the migraines she has had in the past.    Additional history: as documented    Reviewed and updated as needed this visit by clinical staff         Reviewed and updated as needed this visit by Provider         Patient Active Problem List   Diagnosis     CARDIOVASCULAR SCREENING; LDL GOAL LESS THAN 160     Attention deficit disorder of adult     Migraines     Encounter for supervision of other normal pregnancy     History of postpartum depression     History of 2  sections     Encounter for sterilization     Past Surgical History:   Procedure Laterality Date      SECTION       reconstructive knee surgery  1/15/2010       Social History     Tobacco Use     Smoking status: Never Smoker     Smokeless tobacco: Never Used   Substance Use Topics     Alcohol use: Yes     Comment: occasion-not in PG     Family History   Problem Relation Age of Onset     Cancer Mother         cervical cancer     Depression Mother      Diabetes Maternal Grandmother      Hypertension Maternal Grandmother      Depression Paternal Grandmother          Current Outpatient Medications   Medication Sig Dispense Refill     Prenatal MV-Min-Fe Fum-FA-DHA (PRENATAL 1 PO)        Allergies   Allergen Reactions     Oxycodone Nausea and Vomiting     Seasonal Allergies      BP Readings from Last 3 Encounters:   19 124/70   19 104/70   19 104/74    Wt Readings from Last 3 Encounters:   19 100.2 kg (220 lb 12.8 oz)   19 101.9 kg (224 lb 9.6 oz)   19 103.4 kg (228 lb)                  Labs reviewed in EPIC    ROS:  Constitutional, HEENT, cardiovascular, pulmonary, gi and gu systems are negative, except as otherwise noted.    OBJECTIVE:     /70   Pulse 68   Temp 97  F (36.1  C) (Temporal)   Resp 16   Ht 1.632 m (5' 4.25\")   Wt 100.2 kg (220 lb 12.8 oz) "   BMI 37.60 kg/m    Body mass index is 37.6 kg/m .  GENERAL: healthy, alert and no distress  EYES: Eyes grossly normal to inspection, PERRL and conjunctivae and sclerae normal  HENT: ear canals and TM's normal, nose and mouth without ulcers or lesions  NECK: no adenopathy, no asymmetry, masses, or scars and thyroid normal to palpation  RESP: lungs clear to auscultation - no rales, rhonchi or wheezes  CV: regular rate and rhythm, normal S1 S2, no S3 or S4, no murmur, click or rub, no peripheral edema and peripheral pulses strong  MS: Palpable tension in the muscles of her left upper trapezius and cervical paraspinal muscles, looking at her shoulders straight on there is a notable asymmetry with her left shoulder appearing moderately raised up in comparison to her right  SKIN: no suspicious lesions or rashes  NEURO: Normal strength and tone, mentation intact and speech normal  PSYCH: mentation appears normal, affect normal/bright, anxious, judgement and insight intact and appearance well groomed    Diagnostic Test Results:  none     ASSESSMENT/PLAN:     1. Tension headache  I suspect her headaches are related to tension in her left neck and shoulder from holding her infant on that side and likely exacerbated by wearing a bra that is not supportive.  I recommended looking at options for an infant carrier with lumbar support so that she could carry her son without having to use that left arm and shoulder.  I also recommended looking at wearing a more supportive bra to hold up her breasts and take strain off of her shoulders and neck.  I also recommended that she use heat and massage for the muscles of her left shoulder and neck to reduce tension.  Also recommended that she work with physical therapy has a suspect this will help her to improve her symptoms.  If her symptoms should persist or worsen with conservative measures I recommend that she follow-up.  - PHYSICAL THERAPY REFERRAL; Future    2. Neck pain  See note  #1  - PHYSICAL THERAPY REFERRAL; Future    JEFF Levi [facility]

## 2019-05-17 ENCOUNTER — OFFICE VISIT (OUTPATIENT)
Dept: FAMILY MEDICINE | Facility: OTHER | Age: 29
End: 2019-05-17
Payer: COMMERCIAL

## 2019-05-17 VITALS
SYSTOLIC BLOOD PRESSURE: 124 MMHG | WEIGHT: 220.8 LBS | DIASTOLIC BLOOD PRESSURE: 70 MMHG | TEMPERATURE: 97 F | HEIGHT: 64 IN | RESPIRATION RATE: 16 BRPM | HEART RATE: 68 BPM | BODY MASS INDEX: 37.69 KG/M2

## 2019-05-17 DIAGNOSIS — G44.209 TENSION HEADACHE: Primary | ICD-10-CM

## 2019-05-17 DIAGNOSIS — M54.2 NECK PAIN: ICD-10-CM

## 2019-05-17 PROCEDURE — 99213 OFFICE O/P EST LOW 20 MIN: CPT | Performed by: STUDENT IN AN ORGANIZED HEALTH CARE EDUCATION/TRAINING PROGRAM

## 2019-05-17 ASSESSMENT — MIFFLIN-ST. JEOR: SCORE: 1715.54

## 2019-05-17 NOTE — PATIENT INSTRUCTIONS
Use ice or heat on your neck and shoulder, use whichever feels better.    Massage the knots in your neck and shoulder.    Try the chiropractor to see if this helps.    TABITHA DanielsC

## 2019-09-18 NOTE — PROGRESS NOTES
"   SUBJECTIVE:   CC: Scotty Garber is an 28 year old woman who presents for preventive health visit.     Healthy Habits:     Getting at least 3 servings of Calcium per day:  Yes    Bi-annual eye exam:  Yes    Dental care twice a year:  Yes    Sleep apnea or symptoms of sleep apnea:  None    Diet:  Regular (no restrictions)    Frequency of exercise:  None    Taking medications regularly:  Yes    Medication side effects:  Not applicable    PHQ-2 Total Score: 2    Additional concerns today:  Yes    Weight - she has been working on losing weight by eating healthier. She admits to stress eating and wonders if she \"blocks out\" some of the things she eats. She usually drinks a smoothie in the mornings mixed with protein, salad for lunch and whatever she prepares for dinner for her family. She has lost 12-15 lbs but hit a plateau and is frustrated.    She would like screening for diabetes as her dad was diagnosed recently with diabetes.    She has been struggling with her mood and attributes some of it to post-partum depression and stress of taking care of 3 young children. She worries a lot about the health and safety of her children. She has a history of generalized anxiety. She took medication in high school for this but is not sure of the names of the medications. She was thinking of stopping breastfeeding to start a medication for anxiety but has decided to tough it out. Her  and parents are very supportive of her and her family.     She has also been having nausea every time she eats. She reports it \"feels like my stomach flips\" and she gets nauseated. She has her gallbladder. She has not noticed that it is worse with certain foods. She will sometimes have diarrhea after the nausea starts and while she is having a bowel movement she will have pain in her stomach. She has no history of gallbladder disease. Her cousin on her dad's side just had his gallbladder out. She has been taking ibuprofen 600 mg 1-2 " times per day for headaches and migraines. She has been having migraines 3-4 times per week. The migraines are always on the left side of her head and seem to radiate from her left neck and shoulder. She carries all of her children on her left hip and her oldest is 5 years old. She saw a chiropractor and it helped a little. She had migraines in high school too and had a medication she took.     Today's PHQ-2 Score:   PHQ-2 (  [employer]) 2019   Q1: Little interest or pleasure in doing things 1   Q2: Feeling down, depressed or hopeless 1   PHQ-2 Score 2   Q1: Little interest or pleasure in doing things Several days   Q2: Feeling down, depressed or hopeless Several days   PHQ-2 Score 2       Abuse: Current or Past(Physical, Sexual or Emotional)- No  Do you feel safe in your environment? Yes    Social History     Tobacco Use     Smoking status: Never Smoker     Smokeless tobacco: Never Used   Substance Use Topics     Alcohol use: Yes     Comment: occasion-not in PG     If you drink alcohol do you typically have >3 drinks per day or >7 drinks per week? No    Alcohol Use 2019   Prescreen: >3 drinks/day or >7 drinks/week? No   Prescreen: >3 drinks/day or >7 drinks/week? -   No flowsheet data found.    Reviewed orders with patient.  Reviewed health maintenance and updated orders accordingly - Yes  Lab work is in process  Labs reviewed in EPIC  BP Readings from Last 3 Encounters:   19 116/74   19 124/70   19 104/70    Wt Readings from Last 3 Encounters:   19 96.8 kg (213 lb 6.4 oz)   19 100.2 kg (220 lb 12.8 oz)   19 101.9 kg (224 lb 9.6 oz)                  Patient Active Problem List   Diagnosis     CARDIOVASCULAR SCREENING; LDL GOAL LESS THAN 160     Attention deficit disorder of adult     Migraines     Encounter for supervision of other normal pregnancy     History of postpartum depression     History of 2  sections     Encounter for sterilization     Past Surgical  History:   Procedure Laterality Date      SECTION       reconstructive knee surgery  1/15/2010       Social History     Tobacco Use     Smoking status: Never Smoker     Smokeless tobacco: Never Used   Substance Use Topics     Alcohol use: Yes     Comment: occasion-not in PG     Family History   Problem Relation Age of Onset     Cancer Mother         cervical cancer     Depression Mother      Other - See Comments Father         pre-diabetes     Diabetes Maternal Grandmother      Hypertension Maternal Grandmother      Depression Paternal Grandmother          Current Outpatient Medications   Medication Sig Dispense Refill     Prenatal MV-Min-Fe Fum-FA-DHA (PRENATAL 1 PO)        Allergies   Allergen Reactions     Oxycodone Nausea and Vomiting     Seasonal Allergies        Mammogram not appropriate for this patient based on age.    Pertinent mammograms are reviewed under the imaging tab.  History of abnormal Pap smear: NO - age 21-29 PAP every 3 years recommended  PAP / HPV Latest Ref Rng & Units 2018 3/28/2014 5/15/2013   PAP - NIL NIL NIL   HPV 16 DNA NEG:Negative Negative - -   HPV 18 DNA NEG:Negative Negative - -   OTHER HR HPV NEG:Negative Negative - -     Reviewed and updated as needed this visit by clinical staff  Tobacco  Allergies  Meds  Med Hx  Surg Hx  Fam Hx  Soc Hx        Reviewed and updated as needed this visit by Provider        Past Medical History:   Diagnosis Date     Depression      History of macrosomia in infant in prior pregnancy, currently pregnant 2016     History of postpartum depression 2016      Past Surgical History:   Procedure Laterality Date      SECTION       reconstructive knee surgery  1/15/2010     OB History    Para Term  AB Living   5 3 3 0 2 3   SAB TAB Ectopic Multiple Live Births   1 1 0 0 3      # Outcome Date GA Lbr Erlin/2nd Weight Sex Delivery Anes PTL Lv   5 Term 19 39w2d  4.961 kg (10 lb 15 oz) M CS-LTranv Spinal  N JEAN      Apgar1: 7  Apgar5: 8   4 Term 06/01/17 39w2d  3.997 kg (8 lb 13 oz) F CS-LTranv Spinal N JEAN      Apgar1: 8  Apgar5: 8   3 Term 10/25/14 40w5d 14:00 5.33 kg (11 lb 12 oz) M CS-LTranv EPI  JEAN      Complications: Failure to Progress in First Stage, Cephalopelvic Disproportion      Name: Felipe      Apgar1: 8  Apgar5: 9   2 TAB 03/17/08              Birth Comments: D&C   1 SAB                Review of Systems   Constitutional: Negative for chills and fever.   HENT: Negative for congestion, ear pain, hearing loss and sore throat.    Eyes: Negative for pain and visual disturbance.   Respiratory: Negative for cough and shortness of breath.    Cardiovascular: Negative for chest pain, palpitations and peripheral edema.   Gastrointestinal: Positive for abdominal pain, constipation, diarrhea and nausea. Negative for heartburn and hematochezia.   Breasts:  Negative for tenderness, breast mass and discharge.   Genitourinary: Positive for frequency. Negative for dysuria, genital sores, hematuria, pelvic pain, urgency, vaginal bleeding and vaginal discharge.   Musculoskeletal: Positive for arthralgias and myalgias. Negative for joint swelling.   Skin: Negative for rash.   Neurological: Positive for headaches. Negative for dizziness, weakness and paresthesias.   Psychiatric/Behavioral: Positive for mood changes. The patient is nervous/anxious.      CONSTITUTIONAL: NEGATIVE for fever, chills, change in weight  INTEGUMENTARU/SKIN: NEGATIVE for worrisome rashes, moles or lesions  EYES: NEGATIVE for vision changes or irritation  ENT: NEGATIVE for ear, mouth and throat problems  RESP: NEGATIVE for significant cough or SOB  BREAST: NEGATIVE for masses, tenderness or discharge  CV: NEGATIVE for chest pain, palpitations or peripheral edema  GI: NEGATIVE for nausea, abdominal pain, heartburn, or change in bowel habits  : NEGATIVE for unusual urinary or vaginal symptoms. Periods are regular.  MUSCULOSKELETAL: NEGATIVE for  "significant arthralgias or myalgia  NEURO: NEGATIVE for weakness, dizziness or paresthesias  ENDOCRINE: NEGATIVE for temperature intolerance, skin/hair changes  PSYCHIATRIC: NEGATIVE for changes in mood or affect     OBJECTIVE:   /74   Pulse 68   Temp 97.5  F (36.4  C) (Temporal)   Resp 16   Ht 1.614 m (5' 3.54\")   Wt 96.8 kg (213 lb 6.4 oz)   BMI 37.16 kg/m    Physical Exam  GENERAL: healthy, alert and no distress  EYES: Eyes grossly normal to inspection, PERRL and conjunctivae and sclerae normal  HENT: ear canals and TM's normal, nose and mouth without ulcers or lesions  NECK: no adenopathy, no asymmetry, masses, or scars and thyroid normal to palpation  RESP: lungs clear to auscultation - no rales, rhonchi or wheezes  BREAST: normal without masses, tenderness or nipple discharge and no palpable axillary masses or adenopathy  CV: regular rate and rhythm, normal S1 S2, no S3 or S4, no murmur, click or rub, no peripheral edema and peripheral pulses strong  ABDOMEN: soft, nontender, no hepatosplenomegaly, no masses and bowel sounds normal  MS: no gross musculoskeletal defects noted, no edema  SKIN: no suspicious lesions or rashes  NEURO: Normal strength and tone, mentation intact and speech normal  PSYCH: mentation appears normal, affect normal/bright    Diagnostic Test Results:  Labs reviewed in Epic    ASSESSMENT/PLAN:   1. Routine general medical examination at a health care facility  See notes    2. Need for prophylactic vaccination and inoculation against influenza  - INFLUENZA VACCINE IM > 6 MONTHS VALENT IIV4 [40234]  - Vaccine Administration, Initial [40039]    3. Screening for diabetes mellitus  - **Glucose FUTURE anytime; Future    4. Lipid screening  - Lipid panel reflex to direct LDL Fasting; Future    5. Anemia, unspecified type  - **Hemoglobin FUTURE anytime; Future    6. Postprandial nausea  Will get ultrasound to r/o gallbladder disease. Also discussed other possible etiologies of GERD, " "peptic ulcer, IBS.  - US Abdomen Limited; Future    7. Unable to lose weight  Discussed watching portion sizes at supper and being mindful of what she may be snacking on during the day. Will check TSH to r/o thyroid disease.   - TSH with free T4 reflex; Future    8. Migraine without aura and without status migrainosus, not intractable  I pressed on the knots in her left shoulder and this caused radiation of pain into her left scalp similar to what she gets with migraines. Highly suspect left neck, shoulder tension as trigger for migraines. Recommend carrying the kids more with the right arm and using heat and massage to work out the knots in the left shoulder.       COUNSELING:  Reviewed preventive health counseling, as reflected in patient instructions       Regular exercise       Healthy diet/nutrition       Contraception       Family planning    Estimated body mass index is 37.16 kg/m  as calculated from the following:    Height as of this encounter: 1.614 m (5' 3.54\").    Weight as of this encounter: 96.8 kg (213 lb 6.4 oz).    Weight management plan: Discussed healthy diet and exercise guidelines     reports that she has never smoked. She has never used smokeless tobacco.      Counseling Resources:  ATP IV Guidelines  Pooled Cohorts Equation Calculator  Breast Cancer Risk Calculator  FRAX Risk Assessment  ICSI Preventive Guidelines  Dietary Guidelines for Americans, 2010  USDA's MyPlate  ASA Prophylaxis  Lung CA Screening    JEFF Levi [facility]  "

## 2019-09-23 ENCOUNTER — OFFICE VISIT (OUTPATIENT)
Dept: FAMILY MEDICINE | Facility: OTHER | Age: 29
End: 2019-09-23
Payer: COMMERCIAL

## 2019-09-23 VITALS
BODY MASS INDEX: 36.43 KG/M2 | HEIGHT: 64 IN | HEART RATE: 68 BPM | TEMPERATURE: 97.5 F | RESPIRATION RATE: 16 BRPM | WEIGHT: 213.4 LBS | SYSTOLIC BLOOD PRESSURE: 116 MMHG | DIASTOLIC BLOOD PRESSURE: 74 MMHG

## 2019-09-23 DIAGNOSIS — R63.8 UNABLE TO LOSE WEIGHT: ICD-10-CM

## 2019-09-23 DIAGNOSIS — Z13.220 LIPID SCREENING: ICD-10-CM

## 2019-09-23 DIAGNOSIS — D64.9 ANEMIA, UNSPECIFIED TYPE: ICD-10-CM

## 2019-09-23 DIAGNOSIS — Z23 NEED FOR PROPHYLACTIC VACCINATION AND INOCULATION AGAINST INFLUENZA: ICD-10-CM

## 2019-09-23 DIAGNOSIS — G43.009 MIGRAINE WITHOUT AURA AND WITHOUT STATUS MIGRAINOSUS, NOT INTRACTABLE: ICD-10-CM

## 2019-09-23 DIAGNOSIS — Z13.1 SCREENING FOR DIABETES MELLITUS: ICD-10-CM

## 2019-09-23 DIAGNOSIS — R11.0 POSTPRANDIAL NAUSEA: ICD-10-CM

## 2019-09-23 DIAGNOSIS — Z00.00 ROUTINE GENERAL MEDICAL EXAMINATION AT A HEALTH CARE FACILITY: Primary | ICD-10-CM

## 2019-09-23 PROCEDURE — 99395 PREV VISIT EST AGE 18-39: CPT | Mod: 25 | Performed by: STUDENT IN AN ORGANIZED HEALTH CARE EDUCATION/TRAINING PROGRAM

## 2019-09-23 PROCEDURE — 90471 IMMUNIZATION ADMIN: CPT | Performed by: STUDENT IN AN ORGANIZED HEALTH CARE EDUCATION/TRAINING PROGRAM

## 2019-09-23 PROCEDURE — 99214 OFFICE O/P EST MOD 30 MIN: CPT | Mod: 25 | Performed by: STUDENT IN AN ORGANIZED HEALTH CARE EDUCATION/TRAINING PROGRAM

## 2019-09-23 PROCEDURE — 90686 IIV4 VACC NO PRSV 0.5 ML IM: CPT | Performed by: STUDENT IN AN ORGANIZED HEALTH CARE EDUCATION/TRAINING PROGRAM

## 2019-09-23 ASSESSMENT — MIFFLIN-ST. JEOR: SCORE: 1670.73

## 2019-09-23 ASSESSMENT — ENCOUNTER SYMPTOMS
FEVER: 0
JOINT SWELLING: 0
DIZZINESS: 0
EYE PAIN: 0
HEADACHES: 1
NERVOUS/ANXIOUS: 1
CHILLS: 0
HEMATOCHEZIA: 0
CONSTIPATION: 1
ABDOMINAL PAIN: 1
BREAST MASS: 0
PALPITATIONS: 0
DIARRHEA: 1
SORE THROAT: 0
NAUSEA: 1
FREQUENCY: 1
HEARTBURN: 0
DYSURIA: 0
HEMATURIA: 0
COUGH: 0
ARTHRALGIAS: 1
PARESTHESIAS: 0
WEAKNESS: 0
SHORTNESS OF BREATH: 0
MYALGIAS: 1

## 2019-09-24 ENCOUNTER — TELEPHONE (OUTPATIENT)
Dept: FAMILY MEDICINE | Facility: OTHER | Age: 29
End: 2019-09-24

## 2019-09-24 NOTE — TELEPHONE ENCOUNTER
Spoke to patient and advised her of message below. Patient verbalized understanding. No further questions.

## 2019-09-24 NOTE — TELEPHONE ENCOUNTER
The order was placed yesterday at the visit so she can call any time to schedule the ultrasound appointment.  TABITHA DanielsC

## 2019-09-24 NOTE — TELEPHONE ENCOUNTER
Reason for Call: Request for an order or referral:    Order or referral being requested: Ultra sound    Date needed: as soon as possible    Has the patient been seen by the PCP for this problem? YES    Additional comments: Pt called and requesting orders for an ultra sound. Please Advice thank you    Phone number Patient can be reached at:  Home number on file [phone] (home) or Cell number on file:    Telephone Information:   Mobile [phone]       Best Time:  anytime    Can we leave a detailed message on this number?  YES    Call taken on 9/24/2019 at 1:53 PM by Nanci Grimes

## 2019-09-30 DIAGNOSIS — Z13.1 SCREENING FOR DIABETES MELLITUS: ICD-10-CM

## 2019-09-30 DIAGNOSIS — D64.9 ANEMIA, UNSPECIFIED TYPE: ICD-10-CM

## 2019-09-30 DIAGNOSIS — Z13.220 LIPID SCREENING: ICD-10-CM

## 2019-09-30 LAB
CHOLEST SERPL-MCNC: 235 MG/DL
GLUCOSE SERPL-MCNC: 82 MG/DL (ref 70–99)
HDLC SERPL-MCNC: 48 MG/DL
HGB BLD-MCNC: 14.3 G/DL (ref 11.7–15.7)
LDLC SERPL CALC-MCNC: 160 MG/DL
NONHDLC SERPL-MCNC: 187 MG/DL
TRIGL SERPL-MCNC: 137 MG/DL

## 2019-09-30 PROCEDURE — 80061 LIPID PANEL: CPT | Performed by: STUDENT IN AN ORGANIZED HEALTH CARE EDUCATION/TRAINING PROGRAM

## 2019-09-30 PROCEDURE — 36415 COLL VENOUS BLD VENIPUNCTURE: CPT | Performed by: STUDENT IN AN ORGANIZED HEALTH CARE EDUCATION/TRAINING PROGRAM

## 2019-09-30 PROCEDURE — 85018 HEMOGLOBIN: CPT | Performed by: STUDENT IN AN ORGANIZED HEALTH CARE EDUCATION/TRAINING PROGRAM

## 2019-09-30 PROCEDURE — 82947 ASSAY GLUCOSE BLOOD QUANT: CPT | Performed by: STUDENT IN AN ORGANIZED HEALTH CARE EDUCATION/TRAINING PROGRAM

## 2019-12-30 ENCOUNTER — TELEPHONE (OUTPATIENT)
Dept: DERMATOLOGY | Facility: CLINIC | Age: 29
End: 2019-12-30

## 2019-12-30 NOTE — TELEPHONE ENCOUNTER
**LOOK AT HEALTH MAINTENANCE**        Dermatology Pre-visit Call:    Reason for visit :Psoriasis    Any personal history of skin cancers: No    Any family history of skin cancers: No    Was the patient referred: No    If the patient was referred, are records obtained: No.     Has the patient seen a dermatologist in the past: No. Records obtained: No    Patient Reminders Given:  --Please, make sure you bring an updated list of your medications, allergies and insurance information.  --Plan on being in our facility for approximately one hour, this includes the registration process, office visit, education and check-out process.  If you are having a procedure, more time may be required.     --We are located on the second floor of the building, check in desk D.   --If you need to cancel or reschedule, call [phone].  --We look forward to seeing you in Dermatology Clinic.

## 2020-05-08 ENCOUNTER — MYC MEDICAL ADVICE (OUTPATIENT)
Dept: FAMILY MEDICINE | Facility: OTHER | Age: 30
End: 2020-05-08

## 2020-05-11 ENCOUNTER — VIRTUAL VISIT (OUTPATIENT)
Dept: FAMILY MEDICINE | Facility: OTHER | Age: 30
End: 2020-05-11
Payer: COMMERCIAL

## 2020-05-11 DIAGNOSIS — R58 ECCHYMOSIS: Primary | ICD-10-CM

## 2020-05-11 PROCEDURE — 99213 OFFICE O/P EST LOW 20 MIN: CPT | Mod: GT | Performed by: STUDENT IN AN ORGANIZED HEALTH CARE EDUCATION/TRAINING PROGRAM

## 2020-05-11 ASSESSMENT — PAIN SCALES - GENERAL: PAINLEVEL: NO PAIN (0)

## 2020-05-11 NOTE — TELEPHONE ENCOUNTER
Please call patient to set up a video visit for today to discuss easy bruising.  Alva Petit NP-C

## 2020-05-11 NOTE — TELEPHONE ENCOUNTER
LMTC< please see message below and assist in scheduling video visit   Thanks  Flower Pearson RT (R)

## 2020-05-11 NOTE — PROGRESS NOTES
"Scotty Garber is a 29 year old female who is being evaluated via a billable video visit.      The patient has been notified of following:     \"This video visit will be conducted via a call between you and your physician/provider. We have found that certain health care needs can be provided without the need for an in-person physical exam.  This service lets us provide the care you need with a video conversation.  If a prescription is necessary we can send it directly to your pharmacy.  If lab work is needed we can place an order for that and you can then stop by our lab to have the test done at a later time.    Video visits are billed at different rates depending on your insurance coverage.  Please reach out to your insurance provider with any questions.    If during the course of the call the physician/provider feels a video visit is not appropriate, you will not be charged for this service.\"    Patient has given verbal consent for Video visit? Yes    How would you like to obtain your AVS? [facility]    Patient would like the video invitation sent by: Text to cell phone: [phone]    Will anyone else be joining your video visit? 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 She has been exercising since March on a daily basis. She was highly motivated to lose weight because she had " looked into bariatric surgery earlier in the year and when she realized she qualified she told herself she was going to give herself one year to see if she could lose the weight on her own. She has lost 45 lbs since 20. She feels that this amount of weight loss is expected based on her efforts. She is eating 1000 calories per day. Last week she moved 10 tons of river rock into their [employer] using only a shovel and wheelbarrow. She does not recall bumping her legs or arms where she has the bruising. She has not started any new supplements OTC. She took ibuprofen 2 tablets every night last week while she was working on the [employer]. She has had no bloody noses, will get mild bleeding of gums but rarely. Her last menstrual cycle was lighter than usual, lasted maybe a day longer and had less cramping. She has had previous c-sections and no problems with bleeding. She has no family history of bleeding disorders. She was burning approximately 4000 calories per day last week and only eating 1000 calories per her diet. She is eating meat, fruit and vegetables mainly.       Video Start Time: 3:35 PM    Patient Active Problem List   Diagnosis     CARDIOVASCULAR SCREENING; LDL GOAL LESS THAN 160     Attention deficit disorder of adult     Migraines     Encounter for supervision of other normal pregnancy     History of postpartum depression     History of 2  sections     Encounter for sterilization     Migraine without aura and without status migrainosus, not intractable     Postprandial nausea     Past Surgical History:   Procedure Laterality Date      SECTION       reconstructive knee surgery  1/15/2010       Social History     Tobacco Use     Smoking status: Never Smoker     Smokeless tobacco: Never Used   Substance Use Topics     Alcohol use: Yes     Comment: occasion-not in PG     Family History   Problem Relation Age of Onset     Cancer Mother         cervical cancer     Depression Mother   "    Other - See Comments Father         pre-diabetes     Diabetes Maternal Grandmother      Hypertension Maternal Grandmother      Depression Paternal Grandmother          No current outpatient medications on file.     Allergies   Allergen Reactions     Oxycodone Nausea and Vomiting     Seasonal Allergies      BP Readings from Last 3 Encounters:   09/23/19 116/74   05/17/19 124/70   04/16/19 104/70    Wt Readings from Last 3 Encounters:   09/23/19 96.8 kg (213 lb 6.4 oz)   05/17/19 100.2 kg (220 lb 12.8 oz)   04/16/19 101.9 kg (224 lb 9.6 oz)                    Reviewed and updated as needed this visit by Provider         Review of Systems   Constitutional, HEENT, cardiovascular, pulmonary, GI, , musculoskeletal, neuro, skin, endocrine and psych systems are negative, except as otherwise noted.      Objective    There were no vitals taken for this visit.  Estimated body mass index is 37.16 kg/m  as calculated from the following:    Height as of 9/23/19: 1.614 m (5' 3.54\").    Weight as of 9/23/19: 96.8 kg (213 lb 6.4 oz).  Physical Exam     GENERAL: Healthy, alert and no distress  EYES: Eyes grossly normal to inspection.  No discharge or erythema, or obvious scleral/conjunctival abnormalities.  RESP: No audible wheeze, cough, or visible cyanosis.  No visible retractions or increased work of breathing.    SKIN: ecchymoses right upper inner arm, right outer thigh, left inner knee extending to inner calf  NEURO: Cranial nerves grossly intact.  Mentation and speech appropriate for age.  PSYCH: Mentation appears normal, affect normal/bright, judgement and insight intact, normal speech and appearance well-groomed.      Diagnostic Test Results:  Labs reviewed in Epic        Assessment & Plan     1. Ecchymosis  I believe the ecchymosis is most likely from trauma while working outside [employer] though she does not recall specifically hitting those areas. It may be theorized that she had minute muscle tears from shoveling " "such heavy loads of rock and that this causes superficial bleeding and thus ecchymosis. It is reassuring that she is having no other signs of easy or prolonged bleeding. I recommended we do some blood work. She will be in the clinic on Friday with her son and if the bruising is not resolving or if she is getting new bruises she wants to do the blood work.   - CBC with platelets and differential; Future  - **Comprehensive metabolic panel FUTURE anytime; Future  - Partial thromboplastin time; Future     BMI:   Estimated body mass index is 37.16 kg/m  as calculated from the following:    Height as of 9/23/19: 1.614 m (5' 3.54\").    Weight as of 9/23/19: 96.8 kg (213 lb 6.4 oz).   Weight management plan: Discussed healthy diet and exercise guidelines        No follow-ups on file.    JEFF Levi CNP  [facility]      Video-Visit Details    Type of service:  Video Visit    Video End Time:3:56 PM    Originating Location (pt. Location): Home    Distant Location (provider location):  [facility]     Platform used for Video Visit: Elen    No follow-ups on file.       JEFF Levi CNP      "

## 2020-10-13 ENCOUNTER — HOSPITAL ENCOUNTER (OUTPATIENT)
Dept: NUTRITION | Facility: CLINIC | Age: 30
Discharge: HOME | End: 2020-10-13
Admitting: STUDENT IN AN ORGANIZED HEALTH CARE EDUCATION/TRAINING PROGRAM
Payer: COMMERCIAL

## 2020-10-13 PROCEDURE — 97802 MEDICAL NUTRITION INDIV IN: CPT | Mod: TEL

## 2020-10-13 NOTE — PROGRESS NOTES
"[facility]  Medical Nutrition Therapy    Visit Type: Initial Assessment  Scotty Garber referred by Va Petit CNP for MNT related to Obesity with nutritional instruct in wt loss- comment: maintaining healthy wt after losing wt    Scotty Garber is a 29 year old female who is being evaluated via a billable telephone visit.      The patient has been notified of following:     \"This telephone visit will be conducted via a call between you and your physician/provider. We have found that certain health care needs can be provided without the need for a physical exam.  This service lets us provide the care you need with a short phone conversation.     Telephone visits are billed at different rates depending on your insurance coverage. During this emergency period, for some insurers they may be billed the same as an in-person visit.  Please reach out to your insurance provider with any questions.    If during the course of the call the physician/provider feels a telephone visit is not appropriate, you will not be charged for this service.\"    Patient has given verbal consent for Telephone visit?  Yes    What phone number would you like to be contacted at? [phone]    Phone call duration: 60 minutes    -pt unable to view dietitian. Dietitian frozen on screen during video visit.         Nutrition Assessment:  Anthropometrics  Height: 5' 3.5\"    BMI: 24.9 kg/m2       Weight: 143 lbs (65 kg)     Wt goal: 140 lbs       IBW: 117 lbs (52.3 kg)  IBW: 124%      Wt hx:   Wt Readings from Last 8 Encounters:   09/23/19 96.8 kg (213 lb 6.4 oz)   05/17/19 100.2 kg (220 lb 12.8 oz)   04/16/19 101.9 kg (224 lb 9.6 oz)   04/11/19 103.4 kg (228 lb)   02/28/19 104.6 kg (230 lb 9.6 oz)   02/19/19 105.4 kg (232 lb 4.8 oz)   02/04/19 117.5 kg (259 lb)   01/28/19 116.1 kg (256 lb)   -pt used Noom 2019 to 2020  -began Nutritsystem 2020  -Wt on 02/2020: 232 lbs   -wt loss of 89 lbs in 8 months   -3 lbs from " goal wt  -looking to transition off wt loss plan and nutrisystem meal plan/foods and maintain wt at goal       Nutrition History     Patient Active Problem List   Diagnosis     CARDIOVASCULAR SCREENING; LDL GOAL LESS THAN 160     Attention deficit disorder of adult     Migraines     Migraine without aura and without status migrainosus, not intractable     Postprandial nausea     Labs: reviewed  Glucose (mg/dL)   Date Value   09/30/2019 82     Cholesterol (mg/dL)   Date Value   09/30/2019 235 (H)     HDL Cholesterol (mg/dL)   Date Value   09/30/2019 48 (L)     LDL Cholesterol Calculated (mg/dL)   Date Value   09/30/2019 160 (H)     Triglycerides (mg/dL)   Date Value   09/30/2019 137   -pt will pursue updated labs soon with PCP  -RD told pt she could also check Vitamin D to see if supplementation was necessary or not. Pt was curious about supplementing with vitamin D product    Meds:   No current outpatient medications on file.     No current facility-administered medications for this encounter.      Supplements: primrose for GI regularity and biotin for skin and hair, ACV capsule taken in the morning, melatonin as needed to sleep at night, no multivitamin    Social/Environmental:   -pt lives with  and 3 kids (6, 3, 20 months)  -pt feels high stress level related to daily life   -pt does not have much time to unwind from stress, however does use exercise or extended walking as able to support lowering stress levels    Nutrition Misc:   -currently on nutrisystem at 1200 kcal plan  -has some episodes of no BM for ~4 days and feels bloated  -when having a BM, feels discomfort in pelvis which resolves once BM is done  -stools are soft, normal, moist, long- has taken laxative to have BM in the past  -minimizes gluten as psoriasis on eyes and hair cleared up greatly after lowering intake      Physical Activity  -daily (6/7 days per week) with HIIT step work outs and walking   -~120 minutes per work out  -routine  since March  -looking to keep exercise but reduce it down to 4/5 days per week at 60-90 minutes per session        Nutrition Prescription  Energy: 1800 kcal/day      -step up approach: 1400, 1600, 1800 then reduce Physical activity   -monitor wt during transition   Protein: 65-78 g/day            Fluid: 9719-7623 mL/day     -based on duration of activity in addition to 1 mL/kcal          Fat: healthy fats inclusion       Carbohydrate: complex       Fiber: 25 g/day            Micronutrient:  -B vitamins (if not eating gluten products)  -Ca, Vitamin D if not getting 3-4 servings through food  -Zinc/Omega 3 for dermatitis  -Vitamin C and Mg for GI motility         Food Record  -work out in am  -bfast (dropping of kids in car): nutrisystem or premium nutr protein shake and a 'smart carb' (typically a fruit)  -am snack: low fat cheese or almonds  -lunch: nutrisystem meal  -pm snack: nutrisystem or premium nutr protein shake and a 'smart carb' (typically a fruit)  -dinner: nutrisystem meal or using childrens plates/bowls limited portion of meal served (ie beef stroganoff)    -evening snack: 2 squares of ghiradelli chocolate before bed     -beverage: water, for caffeine-starbucks cold brew  -dislikes seafood, but does eat tuna  -family eats fairly healthy and pt reports it should not be an issue to cook family meal that meets pt needs        Nutrition Diagnosis:   Food and Nutrition-related knowledge deficit related to adequate kcal intake as evidenced by pt request for RD to support transition from wt loss to wt maintenance and details around macronutrient needs and how to meal plan for self after nutrisystem     Patient Engagement:   Stage of change: active  -pt is excited and ready to transition to this new stage of wt loss  -pt is also concerned about this transition and does not want to lose progress made after hard work done this year  -pt should succeed with tools and diet education given based on historic work with  kcal counting and meal plans.     Nutrition Intervention:  -Educated pt on using MyPlate for meal planning and discussed portion sizes. Provided example for pt's goal daily kcal amount   -Discussed recommended and not recommended foods (choosing WGs, lean meats, low-fat dairy, fresh fruits & veggies, unsat fats)  -Educated pt on metabolism and used the fire analogy; talked about the importance of continuing to consume consistent meals/snacks throughout the day and listening to hunger/fullness cues-no skipping meals   -Discussed healthy snack options- protein+complex CHO  -Educated pt on reading food labels- for fiber specifically  -Discussed fiber-containing grains other than bread: bean pasta, ancient grain rice blends, afshan or sprouted bread to see if skin flares or remains calm  -Encouraged pt to continue to drink adequate water throughout the day and explained the importance of hydration.   -Encouraged pt to stabilize daily PA- include cardiovascular activities w/ultimate goal of 60 min per day and a bit more if pt would like. With days for breaks as well  -Discussed micronutrients that support more frequent bowel movements: vitamin C, magnesium (from food and epsom salt soaks first, not supps)   -Discussed micronutrient support for skin health/dermatitis: zinc and omega 3 rich foods  -Discussed importance of B-vitamins, folate, etc if choosing to not eat grains/gluten   -Provided myPlate myKitchen recipe website   -Discussed the body's transition during shifting of kcals from a long conducted kcal deficit to a more stable approach. We will take it slow as pt is anxious about wt gain.   -Providing High Fiber food list as nutrisystem has high fiber through out meals and pt will feel shift if not incorporating similar foods  -Providing visual example of 1400, 1600, 1800  Kcal meal plan to support pt portioning of own food. Discussed 1 to 2 handful serving size equivalents to support myPlate dishing up of meals.        Nutrition Goals:  1) in steps, begin to increase kcals- 1400, 1600, to goal of 1800 kcal daily   2) once at goal daily kcals, reduce exercise to 4/5 days per week 60-90 minutes (goal)   3) approach meals and snacks from myPlate/mediterranean mindset- variety in color, macro nutrients, food groups to ensure adequate nutrients     Nutrition Follow Up / Monitoring:  Weight, food intake, physical activity     Nutrition Recommendations:  Patient to follow-up with RD in 4-6 weeks.  Patient has RD contact information to call/email if needed.    Start time: 1146  End time: 1248      Janel Rayo MBA, RD LD  Clinical Dietitian  [phone]

## 2020-10-27 ENCOUNTER — IMMUNIZATION (OUTPATIENT)
Dept: FAMILY MEDICINE | Facility: OTHER | Age: 30
End: 2020-10-27
Payer: COMMERCIAL

## 2020-10-27 DIAGNOSIS — Z23 NEED FOR PROPHYLACTIC VACCINATION AND INOCULATION AGAINST INFLUENZA: Primary | ICD-10-CM

## 2020-10-27 PROCEDURE — 99207 PR NO CHARGE NURSE ONLY: CPT

## 2020-10-27 PROCEDURE — 90471 IMMUNIZATION ADMIN: CPT

## 2020-10-27 PROCEDURE — 90686 IIV4 VACC NO PRSV 0.5 ML IM: CPT

## 2020-10-27 NOTE — PROGRESS NOTES
Patient received influenza vaccination today. See immunization tab for complete administration details.     Ashley Carrero MA

## 2020-11-06 ENCOUNTER — NURSE TRIAGE (OUTPATIENT)
Dept: NURSING | Facility: CLINIC | Age: 30
End: 2020-11-06

## 2020-11-06 ENCOUNTER — VIRTUAL VISIT (OUTPATIENT)
Dept: FAMILY MEDICINE | Facility: OTHER | Age: 30
End: 2020-11-06

## 2020-11-06 NOTE — TELEPHONE ENCOUNTER
Scotty reports COVID exposure on 11/2. Her dad showed COVID symptoms on 11/3 and tested positive. Asks for advice. She does not have any symptoms.    FNA advised per CDC guidelines -  - quarantine for 14 days from date of exposure  - okay to be tested, gave her options for COVID testing  - call back if she develops symptoms    She verbalized understanding.     COVID 19 Nurse Triage Plan/Patient Instructions    Please be aware that novel coronavirus (COVID-19) may be circulating in the community. If you develop symptoms such as fever, cough, or SOB or if you have concerns about the presence of another infection including coronavirus (COVID-19), please contact your health care provider or visit [email or web address].     Disposition/Instructions    Virtual Visit with provider recommended. Reference Visit Selection Guide. [email or web address]    Thank you for taking steps to prevent the spread of this virus.  o Limit your contact with others.  o Wear a simple mask to cover your cough.  o Wash your hands well and often.    Resources    [facility]: About COVID-19: [email or web address]    CDC: What to Do If You're Sick: www.cdc.gov/coronavirus/2019-ncov/about/steps-when-sick.html    CDC: Ending Home Isolation: www.cdc.gov/coronavirus/2019-ncov/hcp/disposition-in-home-patients.html     CDC: Caring for Someone: www.cdc.gov/coronavirus/2019-ncov/if-you-are-sick/care-for-someone.html     [facility]: Interim Guidance for Hospital Discharge to Home: www.health.[facility].mn.us/diseases/coronavirus/hcp/hospdischarge.pdf    [facility] clinical trials (COVID-19 research studies): clinicalaffairs.[facility]-clinical-trials     Below are the COVID-19 hotlines at the [facility] ([facility]). Interpreters are available.   o For health questions: Call 479-156-7538 or 1-437.809.6819 (7 a.m. to 7 p.m.)  o For questions about schools and childcare: Call 519-804-9510 or 1-996.424.9886 (7 a.m. to 7 p.m.)                        Reason for Disposition    [1] COVID-19 EXPOSURE (Close Contact) AND [2] within last 14 days BUT [3] NO symptoms    Protocols used: CORONAVIRUS (COVID-19) EXPOSURE-A- 8.4.20

## 2020-11-06 NOTE — PROGRESS NOTES
"Date: 2020 09:17:35  Clinician: Moriah Salazar  Clinician NPI: [id number]  Patient: Scotty Garber  Patient : 1990  Patient Address: [id number] [place] [address]  Patient Phone: [phone]  Visit Protocol: URI  Patient Summary:  Scotty is a 29 year old ( : 1990 ) female who initiated a [facility] Visit for COVID-19 (Coronavirus) evaluation and screening. When asked the question \"Please sign me up to receive news, health information and promotions. \", Scotty responded \"No\".    When asked when her symptoms started, Scotty reported that she does not have any symptoms.   She denies taking antibiotic medication in the past month and having recent facial or sinus surgery in the past 60 days.    Pertinent COVID-19 (Coronavirus) information  Scotty does not work or volunteer as healthcare worker or a . In the past 14 days, Scotty has not worked or volunteered at a healthcare facility or group living setting.   In the past 14 days, she also has not lived in a congregate living setting.   Scotty has had a close contact with a laboratory-confirmed COVID-19 patient in the last 14 days. She was not exposed at her work. Date Scotty was exposed to the laboratory-confirmed COVID-19 patient: 2020   Additional information about contact with COVID-19 (Coronavirus) patient as reported by the patient (free text): I was at my father home who tested positive the following day. I hugged him several times   Scotty is not living in the same household with the COVID-19 positive patient. She was in an enclosed space for greater than 15 minutes with the COVID-19 patient.   During the encounter, neither were wearing masks.   Since 2019, Scotty has not been tested for COVID-19 and has not had upper respiratory infection or influenza-like illness.   Pertinent medical history  Scotty does not get yeast infections when she takes antibiotics.   Scotty does not need a return to " work/school note.   Weight: 139 lbs   Scotty does not smoke or use smokeless tobacco.   She denies pregnancy and denies breastfeeding. She has menstruated in the past month.   Weight: 139 lbs    MEDICATIONS: No current medications, ALLERGIES: NKDA  Clinician Response:  Dear Scotty,   Your symptoms show that you may have coronavirus (COVID-19). This illness can cause fever, cough and trouble breathing. Many people get a mild case and get better on their own. Some people can get very sick.  What should I do?  We would like to test you for this virus.   1. Please call [phone] to schedule your visit. Explain that you were referred by [facility] to have a COVID-19 test. Be [facility].  * If you need to schedule in [facility] please call [phone] or for [employer] employees please call [phone].  * If you need to schedule in the [place] area please call [phone]. [place] employees call [phone].  The following will serve as your written order for this COVID Test, ordered by me, for the indication of suspected COVID [Z20.828]: The test will be ordered in [facility] our electronic health record, after you are scheduled. It will show as ordered and authorized by Ricardo Crowder MD.  Order: COVID-19 (Coronavirus) PCR for SYMPTOMATIC testing from [facility].   2. When it's time for your COVID test:  Stay at least 6 feet away from others. (If someone will drive you to your test, stay in the backseat, as far away from the  as you can.)   Cover your mouth and nose with a mask, tissue or washcloth.  Go straight to the testing site. Don't make any stops on the way there or back.      3.Starting now: Stay home and away from others (self-isolate) until:   You've had no fever---and no medicine that reduces fever---for one full day (24 hours). And...   Your other symptoms have gotten better. For example, your cough or breathing has improved. And...   At least 10 days have passed since your  "symptoms started.       During this time, don't leave the house except for testing or medical care.   Stay in your own room, even for meals. Use your own bathroom if you can.   Stay away from others in your home. No hugging, kissing or shaking hands. No visitors.  Don't go to work, school or anywhere else.    Clean \"high touch\" surfaces often (doorknobs, counters, handles, etc.). Use a household cleaning spray or wipes. You'll find a full list of  on the EPA website: www.epa.gov/pesticide-registration/list-n-disinfectants-use-against-sars-cov-2.   Cover your mouth and nose with a mask, tissue or washcloth to avoid spreading germs.  Wash your hands and face often. Use soap and water.  Caregivers in these groups are at risk for severe illness due to COVID-19:  o People 65 years and older  o People who live in a nursing home or long-term care facility  o People with chronic disease (lung, heart, cancer, diabetes, kidney, liver, immunologic)  o People who have a weakened immune system, including those who:   Are in cancer treatment  Take medicine that weakens the immune system, such as corticosteroids  Had a bone marrow or organ transplant  Have an immune deficiency  Have poorly controlled HIV or AIDS  Are obese (body mass index of 40 or higher)  Smoke regularly   o Caregivers should wear gloves while washing dishes, handling laundry and cleaning bedrooms and bathrooms.  o Use caution when washing and drying laundry: Don't shake dirty laundry, and use the warmest water setting that you can.  o For more tips, go to www.cdc.gov/coronavirus/2019-ncov/downloads/10Things.pdf.    4.Sign up for [facility]. We know it's scary to hear that you might have COVID-19. We want to track your symptoms to make sure you're okay over the next 2 weeks. Please look for an email from Jessica Dodd---this is a free, online program that we'll use to keep in touch. To sign up, follow the link in the email. Learn more at " [email or web address]  How can I take care of myself?   Get lots of rest. Drink extra fluids (unless a doctor has told you not to).   Take Tylenol (acetaminophen) for fever or pain. If you have liver or kidney problems, ask your family doctor if it's okay to take Tylenol.   Adults can take either:    650 mg (two 325 mg pills) every 4 to 6 hours, or...   1,000 mg (two 500 mg pills) every 8 hours as needed.    Note: Don't take more than 3,000 mg in one day. Acetaminophen is found in many medicines (both prescribed and over-the-counter medicines). Read all labels to be sure you don't take too much.   For children, check the Tylenol bottle for the right dose. The dose is based on the child's age or weight.    If you have other health problems (like cancer, heart failure, an organ transplant or severe kidney disease): Call your specialty clinic if you don't feel better in the next 2 days.       Know when to call 911. Emergency warning signs include:    Trouble breathing or shortness of breath Pain or pressure in the chest that doesn't go away Feeling confused like you haven't felt before, or not being able to wake up Bluish-colored lips or face.  Where can I get more information?   [facility] -- About COVID-19: [email or web address]   CDC -- What to Do If You're Sick: www.cdc.gov/coronavirus/2019-ncov/about/steps-when-sick.html   CDC -- Ending Home Isolation: www.cdc.gov/coronavirus/2019-ncov/hcp/disposition-in-home-patients.html   CDC -- Caring for Someone: www.cdc.gov/coronavirus/2019-ncov/if-you-are-sick/care-for-someone.html   [facility] -- Interim Guidance for Hospital Discharge to Home: www.health.[facility].mn.us/diseases/coronavirus/hcp/hospdischarge.pdf   [facility] clinical trials (COVID-19 research studies): clinicalaffairs.[facility]-clinical-trials    Below are the COVID-19 hotlines at the [place] [facility] ([facility]). Interpreters are available.    [facility]  questions: Call 790-218-0009 or 1-512.279.8410 (7 a.m. to 7 p.m.) For questions about schools and childcare: Call 237-837-7641 or 1-768.512.4380 (7 a.m. to 7 p.m.)    Diagnosis: Contact with and (suspected) exposure to other viral communicable diseases  Diagnosis ICD: Z20.828

## 2020-12-02 ENCOUNTER — MYC MEDICAL ADVICE (OUTPATIENT)
Dept: FAMILY MEDICINE | Facility: OTHER | Age: 30
End: 2020-12-02

## 2020-12-02 ENCOUNTER — OFFICE VISIT (OUTPATIENT)
Dept: FAMILY MEDICINE | Facility: OTHER | Age: 30
End: 2020-12-02
Payer: COMMERCIAL

## 2020-12-02 ENCOUNTER — ANCILLARY PROCEDURE (OUTPATIENT)
Dept: GENERAL RADIOLOGY | Facility: OTHER | Age: 30
End: 2020-12-02
Attending: STUDENT IN AN ORGANIZED HEALTH CARE EDUCATION/TRAINING PROGRAM
Payer: COMMERCIAL

## 2020-12-02 VITALS
RESPIRATION RATE: 12 BRPM | WEIGHT: 146 LBS | HEART RATE: 77 BPM | TEMPERATURE: 97.6 F | SYSTOLIC BLOOD PRESSURE: 98 MMHG | DIASTOLIC BLOOD PRESSURE: 62 MMHG | OXYGEN SATURATION: 100 % | BODY MASS INDEX: 25.87 KG/M2 | HEIGHT: 63 IN

## 2020-12-02 DIAGNOSIS — M25.572 PAIN IN JOINT INVOLVING ANKLE AND FOOT, LEFT: Primary | ICD-10-CM

## 2020-12-02 DIAGNOSIS — M25.572 PAIN IN JOINT INVOLVING ANKLE AND FOOT, LEFT: ICD-10-CM

## 2020-12-02 DIAGNOSIS — M54.32 SCIATICA OF LEFT SIDE: ICD-10-CM

## 2020-12-02 PROCEDURE — 73610 X-RAY EXAM OF ANKLE: CPT | Mod: LT | Performed by: RADIOLOGY

## 2020-12-02 PROCEDURE — 99213 OFFICE O/P EST LOW 20 MIN: CPT | Performed by: STUDENT IN AN ORGANIZED HEALTH CARE EDUCATION/TRAINING PROGRAM

## 2020-12-02 ASSESSMENT — PAIN SCALES - GENERAL: PAINLEVEL: MILD PAIN (3)

## 2020-12-02 ASSESSMENT — MIFFLIN-ST. JEOR: SCORE: 1351.25

## 2020-12-02 NOTE — PROGRESS NOTES
"Subjective     Scotty Garber is a 29 year old female who presents to clinic today for the following health issues:    HPI         Musculoskeletal problem/pain  Onset/Duration: 1-2 months  Description  Location: foot, leg and knee - left  Joint Swelling: no  Redness: no  Pain: YES  Warmth: YES  Intensity:  mild, moderate  Progression of Symptoms:  worsening and constant  Accompanying signs and symptoms:   Fevers: no  Numbness/tingling/weakness: no  History  Trauma to the area: no  Recent illness:  no  Previous similar problem: no  Previous evaluation:  no  Precipitating or alleviating factors:  Aggravating factors include: sitting, standing, walking, climbing stairs, lifting, exercise and overuse  Therapies tried and outcome: acetaminophen and Ibuprofen    Review of Systems   Constitutional, HEENT, cardiovascular, pulmonary, gi and gu systems are negative, except as otherwise noted.      Objective    BP 98/62   Pulse 77   Temp 97.6  F (36.4  C)   Resp 12   Ht 1.6 m (5' 2.99\")   Wt 66.2 kg (146 lb)   LMP 11/04/2020   SpO2 100%   Breastfeeding No   BMI 25.87 kg/m    Body mass index is 25.87 kg/m .  Physical Exam   GENERAL APPEARANCE: healthy, alert and no distress  MS: tenderness to palpation of left lateral ankle posterior to malleolus in region of retinaculum, normal ROM, no tenderness to palpation of heel, Achilles or plantar fascia; normal ROM or left knee  SKIN: no suspicious lesions or rashes  NEURO: Normal strength and tone, mentation intact and speech normal  PSYCH: mentation appears normal and affect normal/bright    No results found for this or any previous visit (from the past 24 hour(s)).        Assessment & Plan     Pain in joint involving ankle and foot, left  Localized tenderness to palpation posterior to left lateral malleolus, x-ray normal, suspect ligamentous injury related to exercising regularly. Suspect that left knee pain and left sided sciatica is related to favoring the ankle. She " "has lost around 70 lbs and is worried about gaining weight back. She has been in a good routine exercising and wants to continue to exercise daily if possible. Recommend seeing our podiatrist for further evaluation and recommendations.  - XR Ankle Left G/E 3 Views; Future  - Orthopedic & Spine  Referral; Future    Sciatica of left side  See above notes.        BMI:   Estimated body mass index is 25.87 kg/m  as calculated from the following:    Height as of this encounter: 1.6 m (5' 2.99\").    Weight as of this encounter: 66.2 kg (146 lb).   Weight management plan: Discussed healthy diet and exercise guidelines         No follow-ups on file.    JEFF Levi CNP  [facility]    "

## 2020-12-02 NOTE — TELEPHONE ENCOUNTER
Patient scheduled appointment for 520. Closing encounter.    Next 5 appointments (look out 90 days)    Dec 02, 2020  5:20 PM  SHORT with JEFF Mckinney [facility] ([facility]) [address] [place] [id number]  [phone]

## 2020-12-13 ENCOUNTER — HEALTH MAINTENANCE LETTER (OUTPATIENT)
Age: 30
End: 2020-12-13

## 2021-03-26 DIAGNOSIS — R42 LIGHTHEADEDNESS: ICD-10-CM

## 2021-03-26 LAB
ALBUMIN SERPL-MCNC: 3.9 G/DL (ref 3.4–5)
ALP SERPL-CCNC: 43 U/L (ref 40–150)
ALT SERPL W P-5'-P-CCNC: 27 U/L (ref 0–50)
ANION GAP SERPL CALCULATED.3IONS-SCNC: 5 MMOL/L (ref 3–14)
AST SERPL W P-5'-P-CCNC: 13 U/L (ref 0–45)
BILIRUB SERPL-MCNC: 0.3 MG/DL (ref 0.2–1.3)
BUN SERPL-MCNC: 25 MG/DL (ref 7–30)
CALCIUM SERPL-MCNC: 8.9 MG/DL (ref 8.5–10.1)
CHLORIDE SERPL-SCNC: 104 MMOL/L (ref 94–109)
CO2 SERPL-SCNC: 28 MMOL/L (ref 20–32)
CREAT SERPL-MCNC: 0.71 MG/DL (ref 0.52–1.04)
ERYTHROCYTE [DISTWIDTH] IN BLOOD BY AUTOMATED COUNT: 12 % (ref 10–15)
FERRITIN SERPL-MCNC: 47 NG/ML (ref 12–150)
GFR SERPL CREATININE-BSD FRML MDRD: >90 ML/MIN/{1.73_M2}
GLUCOSE SERPL-MCNC: 95 MG/DL (ref 70–99)
HCT VFR BLD AUTO: 40.9 % (ref 35–47)
HGB BLD-MCNC: 14 G/DL (ref 11.7–15.7)
IRON SATN MFR SERPL: 23 % (ref 15–46)
IRON SERPL-MCNC: 76 UG/DL (ref 35–180)
MCH RBC QN AUTO: 30.6 PG (ref 26.5–33)
MCHC RBC AUTO-ENTMCNC: 34.2 G/DL (ref 31.5–36.5)
MCV RBC AUTO: 89 FL (ref 78–100)
PLATELET # BLD AUTO: 298 10E9/L (ref 150–450)
POTASSIUM SERPL-SCNC: 3.9 MMOL/L (ref 3.4–5.3)
PROT SERPL-MCNC: 7.1 G/DL (ref 6.8–8.8)
RBC # BLD AUTO: 4.58 10E12/L (ref 3.8–5.2)
SODIUM SERPL-SCNC: 137 MMOL/L (ref 133–144)
TIBC SERPL-MCNC: 332 UG/DL (ref 240–430)
TSH SERPL DL<=0.005 MIU/L-ACNC: 1.66 MU/L (ref 0.4–4)
VIT B12 SERPL-MCNC: 522 PG/ML (ref 193–986)
WBC # BLD AUTO: 9.7 10E9/L (ref 4–11)

## 2021-03-26 PROCEDURE — 80053 COMPREHEN METABOLIC PANEL: CPT | Performed by: STUDENT IN AN ORGANIZED HEALTH CARE EDUCATION/TRAINING PROGRAM

## 2021-03-26 PROCEDURE — 82728 ASSAY OF FERRITIN: CPT | Performed by: STUDENT IN AN ORGANIZED HEALTH CARE EDUCATION/TRAINING PROGRAM

## 2021-03-26 PROCEDURE — 36415 COLL VENOUS BLD VENIPUNCTURE: CPT | Performed by: STUDENT IN AN ORGANIZED HEALTH CARE EDUCATION/TRAINING PROGRAM

## 2021-03-26 PROCEDURE — 83550 IRON BINDING TEST: CPT | Performed by: STUDENT IN AN ORGANIZED HEALTH CARE EDUCATION/TRAINING PROGRAM

## 2021-03-26 PROCEDURE — 84443 ASSAY THYROID STIM HORMONE: CPT | Performed by: STUDENT IN AN ORGANIZED HEALTH CARE EDUCATION/TRAINING PROGRAM

## 2021-03-26 PROCEDURE — 82607 VITAMIN B-12: CPT | Performed by: STUDENT IN AN ORGANIZED HEALTH CARE EDUCATION/TRAINING PROGRAM

## 2021-03-26 PROCEDURE — 82306 VITAMIN D 25 HYDROXY: CPT | Performed by: STUDENT IN AN ORGANIZED HEALTH CARE EDUCATION/TRAINING PROGRAM

## 2021-03-26 PROCEDURE — 83540 ASSAY OF IRON: CPT | Performed by: STUDENT IN AN ORGANIZED HEALTH CARE EDUCATION/TRAINING PROGRAM

## 2021-03-26 PROCEDURE — 85027 COMPLETE CBC AUTOMATED: CPT | Performed by: STUDENT IN AN ORGANIZED HEALTH CARE EDUCATION/TRAINING PROGRAM

## 2021-03-28 LAB — DEPRECATED CALCIDIOL+CALCIFEROL SERPL-MC: 46 UG/L (ref 20–75)

## 2021-07-26 NOTE — PROGRESS NOTES
"Scotty is a 30 year old who is being evaluated via a billable video visit.      How would you like to obtain your AVS? {AVS Preference:657410}  If the video visit is dropped, the invitation should be resent by: {video visit invitation:977281}  Will anyone else be joining your video visit? {:169898}  {If patient encounters technical issues they should call [phone] :015890}    Video Start Time: {video visit start/end time for provider to select:848844}    {PROVIDER CHARTING PREFERENCE:048768}    Subjective   Scotty is a 30 year old who presents for the following health issues {ACCOMPANIED BY STATEMENT (Optional):492099}    HPI     {SUPERLIST (Optional):694482}  {additonal problems for provider to add (Optional):817614}    Review of Systems   {ROS COMP (Optional):539678}      Objective           Vitals:  No vitals were obtained today due to virtual visit.    Physical Exam   {video visit exam brief selected:553336::\"GENERAL: Healthy, alert and no distress\",\"EYES: Eyes grossly normal to inspection.  No discharge or erythema, or obvious scleral/conjunctival abnormalities.\",\"RESP: No audible wheeze, cough, or visible cyanosis.  No visible retractions or increased work of breathing.  \",\"SKIN: Visible skin clear. No significant rash, abnormal pigmentation or lesions.\",\"NEURO: Cranial nerves grossly intact.  Mentation and speech appropriate for age.\",\"PSYCH: Mentation appears normal, affect normal/bright, judgement and insight intact, normal speech and appearance well-groomed.\"}    {Diagnostic Test Results (Optional):551572}    {AMBULATORY ATTESTATION (Optional):851164}        Video-Visit Details    Type of service:  Video Visit    Video End Time:{video visit start/end time for provider to select:405533}    Originating Location (pt. Location): {video visit patient location:597720::\"Home\"}    Distant Location (provider location):  [facility]     Platform used for Video Visit: {Virtual Visit " "Platforms:464448::\"Elen\"}  "

## 2021-07-29 ENCOUNTER — VIRTUAL VISIT (OUTPATIENT)
Dept: FAMILY MEDICINE | Facility: OTHER | Age: 31
End: 2021-07-29
Payer: COMMERCIAL

## 2021-07-29 DIAGNOSIS — F41.1 GAD (GENERALIZED ANXIETY DISORDER): ICD-10-CM

## 2021-07-29 DIAGNOSIS — F32.1 CURRENT MODERATE EPISODE OF MAJOR DEPRESSIVE DISORDER WITHOUT PRIOR EPISODE (H): Primary | ICD-10-CM

## 2021-07-29 PROCEDURE — 99213 OFFICE O/P EST LOW 20 MIN: CPT | Mod: TEL | Performed by: FAMILY MEDICINE

## 2021-07-29 RX ORDER — SERTRALINE HYDROCHLORIDE 25 MG/1
25 TABLET, FILM COATED ORAL DAILY
Qty: 30 TABLET | Refills: 1 | Status: SHIPPED | OUTPATIENT
Start: 2021-07-29 | End: 2021-09-22

## 2021-07-29 ASSESSMENT — ANXIETY QUESTIONNAIRES
3. WORRYING TOO MUCH ABOUT DIFFERENT THINGS: NEARLY EVERY DAY
2. NOT BEING ABLE TO STOP OR CONTROL WORRYING: NEARLY EVERY DAY
1. FEELING NERVOUS, ANXIOUS, OR ON EDGE: MORE THAN HALF THE DAYS
7. FEELING AFRAID AS IF SOMETHING AWFUL MIGHT HAPPEN: SEVERAL DAYS
IF YOU CHECKED OFF ANY PROBLEMS ON THIS QUESTIONNAIRE, HOW DIFFICULT HAVE THESE PROBLEMS MADE IT FOR YOU TO DO YOUR WORK, TAKE CARE OF THINGS AT HOME, OR GET ALONG WITH OTHER PEOPLE: VERY DIFFICULT
6. BECOMING EASILY ANNOYED OR IRRITABLE: NEARLY EVERY DAY
5. BEING SO RESTLESS THAT IT IS HARD TO SIT STILL: SEVERAL DAYS
GAD7 TOTAL SCORE: 16

## 2021-07-29 ASSESSMENT — PATIENT HEALTH QUESTIONNAIRE - PHQ9
SUM OF ALL RESPONSES TO PHQ QUESTIONS 1-9: 15
5. POOR APPETITE OR OVEREATING: NEARLY EVERY DAY

## 2021-07-29 ASSESSMENT — PAIN SCALES - GENERAL: PAINLEVEL: NO PAIN (0)

## 2021-07-29 NOTE — PROGRESS NOTES
"Scotty is a 30 year old who is being evaluated via a billable telephone visit.      What phone number would you like to be contacted at? [phone]  How would you like to obtain your AVS? Cody    Assessment & Plan     Current moderate episode of major depressive disorder without prior episode (H)  New diagnosis of major depression.  Discussed a trial of Zoloft 25 mg daily.  She declined therapy.  Discussed home care  Follow-up in 1 month.    WHITNEY (generalized anxiety disorder)  - sertraline (ZOLOFT) 25 MG tablet; Take 1 tablet (25 mg) by mouth daily    }     BMI:   Estimated body mass index is 25.87 kg/m  as calculated from the following:    Height as of 12/2/20: 1.6 m (5' 2.99\").    Weight as of 12/2/20: 66.2 kg (146 lb).   Weight management plan: Discussed healthy diet and exercise guidelines    See Patient Instructions    No follow-ups on file.    Ashlee Masterson MD  [facility]   Scotty is a 30 year old who presents for the following health issues     HPI     Abnormal Mood Symptoms  Onset/Duration: over last year   Description: lost a lot of weight and reevaluating life   Depression (if yes, do PHQ-9): YES  Anxiety (if yes, do WHITNEY-7): YES  Accompanying Signs & Symptoms:  Still participating in activities that you used to enjoy: YES  Fatigue: YES  Irritability: YES  Difficulty concentrating: no  Changes in appetite: YES  Problems with sleep: YES  Heart racing/beating fast: no  Abnormally elevated, expansive, or irritable mood: YES  Persistently increased activity or energy: YES  Thoughts of hurting yourself or others: no  History:  Recent stress or major life event: no  Prior depression or anxiety: ongoing  Family history of depression or anxiety: no  Alcohol/drug use: no  Difficulty sleeping: YES  Precipitating or alleviating factors: None  Therapies tried and outcome: medication(s) - do not remember   PHQ 11/22/2016 7/10/2017 1/24/2019   PHQ-9 Total Score 4 3 8   Q9: " Thoughts of better off dead/self-harm past 2 weeks Not at all Not at all Not at all     WHITNEY-7 SCORE 12/5/2014 7/10/2017   Total Score 6 -   Total Score - 7         Review of Systems   Constitutional, HEENT, cardiovascular, pulmonary, gi and gu systems are negative, except as otherwise noted.      Objective    Vitals - Patient Reported  Pain Score: No Pain (0)        Physical Exam   healthy, alert and no distress  PSYCH: Alert and oriented times 3; coherent speech, normal   rate and volume, able to articulate logical thoughts, able   to abstract reason, no tangential thoughts, no hallucinations   or delusions  Her affect is normal  RESP: No cough, no audible wheezing, able to talk in full sentences  Remainder of exam unable to be completed due to telephone visits            Phone call duration: 9 minutes

## 2021-07-30 ASSESSMENT — ANXIETY QUESTIONNAIRES: GAD7 TOTAL SCORE: 16

## 2021-09-21 DIAGNOSIS — F41.1 GAD (GENERALIZED ANXIETY DISORDER): ICD-10-CM

## 2021-09-22 RX ORDER — SERTRALINE HYDROCHLORIDE 25 MG/1
TABLET, FILM COATED ORAL
Qty: 30 TABLET | Refills: 5 | Status: SHIPPED | OUTPATIENT
Start: 2021-09-22 | End: 2022-02-08

## 2021-09-26 ENCOUNTER — HEALTH MAINTENANCE LETTER (OUTPATIENT)
Age: 31
End: 2021-09-26

## 2022-01-16 ENCOUNTER — HEALTH MAINTENANCE LETTER (OUTPATIENT)
Age: 32
End: 2022-01-16

## 2022-01-20 DIAGNOSIS — F41.1 GAD (GENERALIZED ANXIETY DISORDER): ICD-10-CM

## 2022-01-20 RX ORDER — SERTRALINE HYDROCHLORIDE 25 MG/1
TABLET, FILM COATED ORAL
Qty: 90 TABLET | Refills: 1 | OUTPATIENT
Start: 2022-01-20

## 2022-02-08 ENCOUNTER — VIRTUAL VISIT (OUTPATIENT)
Dept: FAMILY MEDICINE | Facility: OTHER | Age: 32
End: 2022-02-08
Payer: COMMERCIAL

## 2022-02-08 DIAGNOSIS — F41.1 GAD (GENERALIZED ANXIETY DISORDER): Primary | ICD-10-CM

## 2022-02-08 PROCEDURE — 96127 BRIEF EMOTIONAL/BEHAV ASSMT: CPT | Mod: 95 | Performed by: STUDENT IN AN ORGANIZED HEALTH CARE EDUCATION/TRAINING PROGRAM

## 2022-02-08 PROCEDURE — 99213 OFFICE O/P EST LOW 20 MIN: CPT | Mod: 95 | Performed by: STUDENT IN AN ORGANIZED HEALTH CARE EDUCATION/TRAINING PROGRAM

## 2022-02-08 RX ORDER — SERTRALINE HYDROCHLORIDE 25 MG/1
25 TABLET, FILM COATED ORAL DAILY
Qty: 60 TABLET | Refills: 1 | Status: SHIPPED | OUTPATIENT
Start: 2022-02-08 | End: 2022-03-09

## 2022-02-08 ASSESSMENT — ANXIETY QUESTIONNAIRES
1. FEELING NERVOUS, ANXIOUS, OR ON EDGE: NEARLY EVERY DAY
5. BEING SO RESTLESS THAT IT IS HARD TO SIT STILL: SEVERAL DAYS
3. WORRYING TOO MUCH ABOUT DIFFERENT THINGS: NEARLY EVERY DAY
7. FEELING AFRAID AS IF SOMETHING AWFUL MIGHT HAPPEN: NEARLY EVERY DAY
GAD7 TOTAL SCORE: 19
6. BECOMING EASILY ANNOYED OR IRRITABLE: NEARLY EVERY DAY
IF YOU CHECKED OFF ANY PROBLEMS ON THIS QUESTIONNAIRE, HOW DIFFICULT HAVE THESE PROBLEMS MADE IT FOR YOU TO DO YOUR WORK, TAKE CARE OF THINGS AT HOME, OR GET ALONG WITH OTHER PEOPLE: VERY DIFFICULT
2. NOT BEING ABLE TO STOP OR CONTROL WORRYING: NEARLY EVERY DAY

## 2022-02-08 ASSESSMENT — PATIENT HEALTH QUESTIONNAIRE - PHQ9
5. POOR APPETITE OR OVEREATING: NEARLY EVERY DAY
SUM OF ALL RESPONSES TO PHQ QUESTIONS 1-9: 3

## 2022-02-08 NOTE — PROGRESS NOTES
Scotty is a 31 year old who is being evaluated via a billable telephone visit.      What phone number would you like to be contacted at? [phone]  How would you like to obtain your AVS? MyChart    Assessment & Plan     WHITNEY (generalized anxiety disorder)  We will resume the patient's Zoloft at this time at 25 mg which was suiting her well last year.  It was very helpful controlling her anxiety last year.  She could consider bumping her cell up to 50 mg in the coming few weeks if she needs better control.  She should also consider taking daily vitamin D and even Methylfolate.  If she is not getting good control of her anxiety in the coming weeks we will have her come back for a reappointment to discuss things again.  - sertraline (ZOLOFT) 25 MG tablet; Take 1 tablet (25 mg) by mouth daily    ANTHONY TIJERINA MD  [facility]   Scotty is a 31 year old who presents for the following health issues     HPI     Anxiety Follow-Up- has been out of medication for a couple weeks     Patient feels like her anxiety is heightened as of recently with stressors within her family and overall busy lifestyle.  Previously was on Zoloft 25 mg is working well for her in the past.  Wishes to resume this.    Social History     Tobacco Use     Smoking status: Never Smoker     Smokeless tobacco: Never Used   Vaping Use     Vaping Use: Never used   Substance Use Topics     Alcohol use: Yes     Comment: occasion-not in PG     Drug use: No     WHITNEY-7 SCORE 7/10/2017 7/29/2021 2/8/2022   Total Score - - -   Total Score 7 16 19     PHQ 1/24/2019 7/29/2021 2/8/2022   PHQ-9 Total Score 8 15 3   Q9: Thoughts of better off dead/self-harm past 2 weeks Not at all Not at all Not at all         Review of Systems   Constitutional, HEENT, cardiovascular, pulmonary, gi and gu systems are negative, except as otherwise noted.      Objective           Vitals:  No vitals were obtained today due to virtual  visit.    Physical Exam   healthy, alert and no distress  PSYCH: Alert and oriented times 3; coherent speech, normal   rate and volume, able to articulate logical thoughts, able   to abstract reason, no tangential thoughts, no hallucinations   or delusions  Her affect is normal  RESP: No cough, no audible wheezing, able to talk in full sentences  Remainder of exam unable to be completed due to telephone visits          Phone call duration: 9 minutes

## 2022-02-08 NOTE — PROGRESS NOTES
"Scotty is a 31 year old who is being evaluated via a billable video visit.      How would you like to obtain your AVS? MyChart  If the video visit is dropped, the invitation should be resent by: {video visit invitation:327965}  Will anyone else be joining your video visit? {:069371}  {If patient encounters technical issues they should call [phone] :743100}    Video Start Time: {video visit start/end time for provider to select:274493}    {PROVIDER CHARTING PREFERENCE:447628}    Subjective   Scotty is a 31 year old who presents for the following health issues {ACCOMPANIED BY STATEMENT (Optional):541492}    HPI     Anxiety Follow-Up    How are you doing with your anxiety since your last visit? { :616010::\"No change\"}    Are you having other symptoms that might be associated with anxiety? { :565969}    Have you had a significant life event? { :428765}     Are you feeling depressed? { :004863}    Do you have any concerns with your use of alcohol or other drugs? { :647205}    Social History     Tobacco Use     Smoking status: Never Smoker     Smokeless tobacco: Never Used   Vaping Use     Vaping Use: Never used   Substance Use Topics     Alcohol use: Yes     Comment: occasion-not in PG     Drug use: No     WHITNEY-7 SCORE 12/5/2014 7/10/2017 7/29/2021   Total Score 6 - -   Total Score - 7 16     PHQ 7/10/2017 1/24/2019 7/29/2021   PHQ-9 Total Score 3 8 15   Q9: Thoughts of better off dead/self-harm past 2 weeks Not at all Not at all Not at all     {Last PHQ9 or GAD7 Responses (Optional):063384}      How many servings of fruits and vegetables do you eat daily?  { :871821}    On average, how many sweetened beverages do you drink each day (Examples: soda, juice, sweet tea, etc.  Do NOT count diet or artificially sweetened beverages)?   { 1-11:655009}    How many days per week do you exercise enough to make your heart beat faster? { :604678}    How many minutes a day do you exercise enough to make your heart beat faster? { " ":836680}    How many days per week do you miss taking your medication? {0-7 :533112}    {additonal problems for provider to add (Optional):144446}    Review of Systems   {ROS COMP (Optional):589535}      Objective           Vitals:  No vitals were obtained today due to virtual visit.    Physical Exam   {video visit exam brief selected:832680::\"GENERAL: Healthy, alert and no distress\",\"EYES: Eyes grossly normal to inspection.  No discharge or erythema, or obvious scleral/conjunctival abnormalities.\",\"RESP: No audible wheeze, cough, or visible cyanosis.  No visible retractions or increased work of breathing.  \",\"SKIN: Visible skin clear. No significant rash, abnormal pigmentation or lesions.\",\"NEURO: Cranial nerves grossly intact.  Mentation and speech appropriate for age.\",\"PSYCH: Mentation appears normal, affect normal/bright, judgement and insight intact, normal speech and appearance well-groomed.\"}    {Diagnostic Test Results (Optional):883002}    {AMBULATORY ATTESTATION (Optional):613172}        Video-Visit Details    Type of service:  Video Visit    Video End Time:{video visit start/end time for provider to select:613794}    Originating Location (pt. Location): {video visit patient location:065062::\"Home\"}    Distant Location (provider location):  [facility]     Platform used for Video Visit: {Virtual Visit Platforms:437558::\"[employer]\"}  "

## 2022-02-08 NOTE — PATIENT INSTRUCTIONS
Keys to success to help control anxiety, depression, and/or stress    -Physical activity in any way every day even simply going for a walk    -Getting quality sleep every day and maintain a sleep schedule by going to sleep and waking up at the same time every day    -Eating 3 good/healthy meals every day    -Consider mindful activities such as meditation (consider the apps such as Calm or Headspace), yoga, piliates, etc...     -Consider relaxation techniques such as massage, yoga, spa time, use of essential oils    -Removed possible triggers of anxiety or depression (caffeine - nothing paste lunch time, stimulants, nicotine, dietary triggers, stress)    -Light therapy. Simply being outside in sunlight or consider buying a Happy Light    -If you are on medication make sure you are taking it appropriately and as prescribed    -Talking things over with a friend or loved one, even consider formal therapy    -Over the counter medicines such as Vitamin D (3821-8708 international unit(s)), activated folic acid, B vitamins, Omega 3 fatty acids    -Breathing exercises (consider the jace BreathWork)        Therapy options in the area      [facility]  Counseling & mental health  [address], [place]   [phone]      [facility]  Counseling & mental health  [address] [facility]   [phone]      [facility] [place]  Counseling & mental health  [address] [facility]   [phone]      [facility]  Counseling & mental health  [address] [facility]   [phone]      [facility]  [email or web address]  [address], [place], [place] [address] [phone]      [facility]  Health & medical  [address] [place]   [phone]      [facility] [place]  [phone]  [email or web address]      [facility]  Counseling & mental health  [address] [facility] [address], [place]   [phone]    OR call 911 OR report to the closest emergency department

## 2022-02-09 ENCOUNTER — ANCILLARY PROCEDURE (OUTPATIENT)
Dept: GENERAL RADIOLOGY | Facility: OTHER | Age: 32
End: 2022-02-09
Attending: PHYSICIAN ASSISTANT
Payer: COMMERCIAL

## 2022-02-09 ENCOUNTER — OFFICE VISIT (OUTPATIENT)
Dept: FAMILY MEDICINE | Facility: OTHER | Age: 32
End: 2022-02-09
Payer: COMMERCIAL

## 2022-02-09 VITALS
DIASTOLIC BLOOD PRESSURE: 70 MMHG | HEIGHT: 64 IN | OXYGEN SATURATION: 100 % | SYSTOLIC BLOOD PRESSURE: 110 MMHG | TEMPERATURE: 98.3 F | BODY MASS INDEX: 25.61 KG/M2 | WEIGHT: 150 LBS | RESPIRATION RATE: 19 BRPM | HEART RATE: 70 BPM

## 2022-02-09 DIAGNOSIS — M79.672 BILATERAL FOOT PAIN: Primary | ICD-10-CM

## 2022-02-09 DIAGNOSIS — M79.672 BILATERAL FOOT PAIN: ICD-10-CM

## 2022-02-09 DIAGNOSIS — M79.671 BILATERAL FOOT PAIN: ICD-10-CM

## 2022-02-09 DIAGNOSIS — M79.671 BILATERAL FOOT PAIN: Primary | ICD-10-CM

## 2022-02-09 PROCEDURE — 99213 OFFICE O/P EST LOW 20 MIN: CPT | Performed by: PHYSICIAN ASSISTANT

## 2022-02-09 PROCEDURE — 73630 X-RAY EXAM OF FOOT: CPT | Mod: LT | Performed by: RADIOLOGY

## 2022-02-09 ASSESSMENT — MIFFLIN-ST. JEOR: SCORE: 1375.4

## 2022-02-09 ASSESSMENT — ANXIETY QUESTIONNAIRES: GAD7 TOTAL SCORE: 19

## 2022-02-09 NOTE — PROGRESS NOTES
"  Assessment & Plan     Bilateral foot pain  Advised ice, elevation, and taking a break from heavy lifting.  If her symptoms continue I would like her to see podiatry.  - XR Foot Bilateral G/E 3 Views; Future  - Orthopedic  Referral; Future             BMI:   Estimated body mass index is 25.75 kg/m  as calculated from the following:    Height as of this encounter: 1.626 m (5' 4\").    Weight as of this encounter: 68 kg (150 lb).           Return in about 2 weeks (around 2/23/2022), or if symptoms worsen or fail to improve with podiatry.    RUTH Estrada [facility] HOWARD Fajardo is a 31 year old who presents for the following health issues     Foot Injury  This is a new problem. The current episode started 1 to 4 weeks ago. The problem occurs constantly. The problem has been unchanged. The symptoms are aggravated by walking and standing. She has tried ice, heat, NSAIDs and acetaminophen for the symptoms. The treatment provided no relief.      Approximately 2 to 3 weeks ago she developed pain in her feet.  She weight trains and does lift heavy.  She does dead lifts and squats between 180 - 190 pounds.  She does not recall any specific sharp pains or injuries per se.  She just started noticing foot discomfort.  Over her left lateral foot, near her pinky toe she has some mild swelling and discomfort.  She has tenderness underneath in the lateral aspect of her right great toe.  This is uncomfortable with walking.  She is aware of the discomfort even at rest.  She states she does manage pain well.  In high school, she recalls having sesamoid bone fractures.  She is not sure if it was of the right or the left foot at this point however.    Review of Systems   As documented above       Objective    /70   Pulse 70   Temp 98.3  F (36.8  C) (Temporal)   Resp 19   Ht 1.626 m (5' 4\")   Wt 68 kg (150 lb)   SpO2 100%   BMI 25.75 kg/m    Body mass index is 25.75 " kg/m .  Physical Exam   GENERAL: healthy, alert and no distress  MS: Full range of motion of bilateral ankles without any edema erythema or gross deformity.  Right foot reveals normal range of motion of her right great toe but she is tender to palpation over the MTP joint on the plantar aspect but the proximal and distal phalanx are not tender to palpation.  Left foot indicates minimal swelling over the lateral aspect of her fifth toe she does have some minimal tenderness to palpation of the fifth phalanges.  There is no ecchymosis of either foot or erythema.    Results for orders placed or performed in visit on 02/09/22   XR Foot Bilateral G/E 3 Views     Status: None (Preliminary result)    Narrative    FOOT BILATERAL THREE OR MORE VIEWS February 9, 2022 11:00 AM     HISTORY: Bilateral foot pain.    COMPARISON: Left ankle x-rays dated 12/2/2020.     FINDINGS:  Right foot: There is no fracture, joint space loss, malalignment or  erosion.    Left foot: There is no fracture, joint space loss, malalignment or  erosion.      Impression    IMPRESSION: Negative bilateral foot x-rays.

## 2022-02-28 DIAGNOSIS — F41.1 GAD (GENERALIZED ANXIETY DISORDER): ICD-10-CM

## 2022-03-01 ENCOUNTER — MYC MEDICAL ADVICE (OUTPATIENT)
Dept: FAMILY MEDICINE | Facility: OTHER | Age: 32
End: 2022-03-01
Payer: COMMERCIAL

## 2022-03-01 NOTE — TELEPHONE ENCOUNTER
Called [facility] in [place] and spoke to pharmacist, Luciana. #30 tablets will cost $23. Luciana will call and notify pt.     Inocencia Rivera, BSN, RN, PHN  Registered Nurse-Clinic Triage  [facility]  3/1/2022 at 2:54 PM

## 2022-03-09 RX ORDER — SERTRALINE HYDROCHLORIDE 25 MG/1
TABLET, FILM COATED ORAL
Qty: 90 TABLET | Refills: 0 | Status: SHIPPED | OUTPATIENT
Start: 2022-03-09 | End: 2022-10-14

## 2022-07-05 ENCOUNTER — OFFICE VISIT (OUTPATIENT)
Dept: FAMILY MEDICINE | Facility: OTHER | Age: 32
End: 2022-07-05
Payer: COMMERCIAL

## 2022-07-05 VITALS
WEIGHT: 145 LBS | BODY MASS INDEX: 25.69 KG/M2 | HEIGHT: 63 IN | TEMPERATURE: 97.3 F | HEART RATE: 69 BPM | DIASTOLIC BLOOD PRESSURE: 70 MMHG | RESPIRATION RATE: 16 BRPM | SYSTOLIC BLOOD PRESSURE: 106 MMHG | OXYGEN SATURATION: 99 %

## 2022-07-05 DIAGNOSIS — F41.1 GAD (GENERALIZED ANXIETY DISORDER): ICD-10-CM

## 2022-07-05 DIAGNOSIS — Z01.818 PREOP GENERAL PHYSICAL EXAM: Primary | ICD-10-CM

## 2022-07-05 DIAGNOSIS — F32.1 CURRENT MODERATE EPISODE OF MAJOR DEPRESSIVE DISORDER WITHOUT PRIOR EPISODE (H): ICD-10-CM

## 2022-07-05 DIAGNOSIS — Z71.89 ADVANCED CARE PLANNING/COUNSELING DISCUSSION: ICD-10-CM

## 2022-07-05 LAB
ERYTHROCYTE [DISTWIDTH] IN BLOOD BY AUTOMATED COUNT: 12.6 % (ref 10–15)
HCG UR QL: NEGATIVE
HCT VFR BLD AUTO: 40 % (ref 35–47)
HGB BLD-MCNC: 13.6 G/DL (ref 11.7–15.7)
MCH RBC QN AUTO: 30 PG (ref 26.5–33)
MCHC RBC AUTO-ENTMCNC: 34 G/DL (ref 31.5–36.5)
MCV RBC AUTO: 88 FL (ref 78–100)
PLATELET # BLD AUTO: 289 10E3/UL (ref 150–450)
RBC # BLD AUTO: 4.54 10E6/UL (ref 3.8–5.2)
WBC # BLD AUTO: 6.9 10E3/UL (ref 4–11)

## 2022-07-05 PROCEDURE — 81025 URINE PREGNANCY TEST: CPT | Performed by: PHYSICIAN ASSISTANT

## 2022-07-05 PROCEDURE — 99214 OFFICE O/P EST MOD 30 MIN: CPT | Performed by: PHYSICIAN ASSISTANT

## 2022-07-05 PROCEDURE — 36415 COLL VENOUS BLD VENIPUNCTURE: CPT | Performed by: PHYSICIAN ASSISTANT

## 2022-07-05 PROCEDURE — 85027 COMPLETE CBC AUTOMATED: CPT | Performed by: PHYSICIAN ASSISTANT

## 2022-07-05 RX ORDER — CHLORAL HYDRATE 500 MG
1000 CAPSULE ORAL DAILY
COMMUNITY

## 2022-07-05 RX ORDER — MULTIVITAMIN/IRON/FOLIC ACID 18MG-0.4MG
1 TABLET ORAL DAILY
COMMUNITY

## 2022-07-05 ASSESSMENT — PAIN SCALES - GENERAL: PAINLEVEL: NO PAIN (0)

## 2022-07-05 NOTE — PATIENT INSTRUCTIONS
Preparing for Your Surgery  Getting started  A nurse will call you to review your health history and instructions. They will give you an arrival time based on your scheduled surgery time. Please be ready to share:    Your doctor's clinic name and phone number    Your medical, surgical and anesthesia history    A list of allergies and sensitivities    A list of medicines, including herbal treatments and over-the-counter drugs    Whether the patient has a legal guardian (ask how to send us the papers in advance)  Please tell us if you're pregnant--or if there's any chance you might be pregnant. Some surgeries may injure a fetus (unborn baby), so they require a pregnancy test. Surgeries that are safe for a fetus don't always need a test, and you can choose whether to have one.   If you have a child who's having surgery, please ask for a copy of Preparing for Your Child's Surgery.    Preparing for surgery    Within 30 days of surgery: Have a pre-op exam (sometimes called an H&P, or History and Physical). This can be done at a clinic or pre-operative center.  ? If you're having a , you may not need this exam. Talk to your care team.    At your pre-op exam, talk to your care team about all medicines you take. If you need to stop any medicines before surgery, ask when to start taking them again.  ? We do this for your safety. Many medicines can make you bleed too much during surgery. Some change how well surgery (anesthesia) drugs work.    Call your insurance company to let them know you're having surgery. (If you don't have insurance, call [phone].)    Call your clinic if there's any change in your health. This includes signs of a cold or flu (sore throat, runny nose, cough, rash, fever). It also includes a scrape or scratch near the surgery site.    If you have questions on the day of surgery, call your hospital or surgery center.  COVID testing  You may need to be tested for COVID-19 before having  surgery. If so, we will give you instructions.  Eating and drinking guidelines  For your safety: Unless your surgeon tells you otherwise, follow the guidelines below.    Eat and drink as usual until 8 hours before surgery. After that, no food or milk.    Drink clear liquids until 2 hours before surgery. These are liquids you can see through, like water, Gatorade and Propel Water. You may also have black coffee and tea (no cream or milk).    Nothing by mouth within 2 hours of surgery. This includes gum, candy and breath mints.    If you drink alcohol: Stop drinking it the night before surgery.    If your care team tells you to take medicine on the morning of surgery, it's okay to take it with a sip of water.  Preventing infection    Shower or bathe the night before and morning of your surgery. Follow the instructions your clinic gave you. (If no instructions, use regular soap.)    Don't shave or clip hair near your surgery site. We'll remove the hair if needed.    Don't smoke or vape the morning of surgery. You may chew nicotine gum up to 2 hours before surgery. A nicotine patch is okay.  ? Note: Some surgeries require you to completely quit smoking and nicotine. Check with your surgeon.    Your care team will make every effort to keep you safe from infection. We will:  ? Clean our hands often with soap and water (or an alcohol-based hand rub).  ? Clean the skin at your surgery site with a special soap that kills germs.  ? Give you a special gown to keep you warm. (Cold raises the risk of infection.)  ? Wear special hair covers, masks, gowns and gloves during surgery.  ? Give antibiotic medicine, if prescribed. Not all surgeries need antibiotics.  What to bring on the day of surgery    Photo ID and insurance card    Copy of your health care directive, if you have one    Glasses and hearing aides (bring cases)  ? You can't wear contacts during surgery    Inhaler and eye drops, if you use them (tell us about these when  you arrive)    CPAP machine or breathing device, if you use them    A few personal items, if spending the night    If you have . . .  ? A pacemaker, ICD (cardiac defibrillator) or other implant: Bring the ID card.  ? An implanted stimulator: Bring the remote control.  ? A legal guardian: Bring a copy of the certified (court-stamped) guardianship papers.  Please remove any jewelry, including body piercings. Leave jewelry and other valuables at home.  If you're going home the day of surgery    You must have a responsible adult drive you home. They should stay with you overnight as well.    If you don't have someone to stay with you, and you aren't safe to go home alone, we may keep you overnight. Insurance often won't pay for this.  After surgery  If it's hard to control your pain or you need more pain medicine, please call your surgeon's office.  Questions?   If you have any questions for your care team, list them here: _________________________________________________________________________________________________________________________________________________________________________ ____________________________________ ____________________________________ ____________________________________  For informational purposes only. Not to replace the advice of your health care provider. Copyright   2003, 2019 [facility]. All rights reserved. Clinically reviewed by Malia Ramos MD. [employer] [id number] - REV 07/21.

## 2022-07-05 NOTE — PROGRESS NOTES
[address]  [facility] [address]  Phone: [phone]  Primary Provider: No primary care provider on file.  Pre-op Performing Provider: CORONA PELAYO    PREOPERATIVE EVALUATION:  Today's date: 7/5/2022    Scotty Garber is a 31 year old female who presents for a preoperative evaluation.    Surgical Information:  Surgery/Procedure: tummy tuck, breast lift and breast implants  Surgery Location: [facility]  Surgeon: Dr. Deras  Surgery Date: 7/15/22  Time of Surgery: 6am  Where patient plans to recover: At home with family  Fax number for surgical facility: [phone]    Type of Anesthesia Anticipated: to be determined    Assessment & Plan     The proposed surgical procedure is considered LOW risk.    Preop general physical exam  NOTE PATIENT DOESN'T TOLERATE OXYCODONE  - CBC with platelets; Future  - HCG qualitative urine; Future  - CBC with platelets  - HCG qualitative urine    WHITNEY (generalized anxiety disorder)  Current moderate episode of major depressive disorder without prior episode (H)             Risks and Recommendations:  The patient has the following additional risks and recommendations for perioperative complications:   - No identified additional risk factors other than previously addressed    Medication Instructions:  Patient is to take all scheduled medications on the day of surgery    RECOMMENDATION:  APPROVAL GIVEN to proceed with proposed procedure, without further diagnostic evaluation.      Subjective     HPI related to upcoming procedure: Has lost weight and is now having surgery to help with the changes in her body after weight loss.     Preop Questions 7/5/2022   1. Have you ever had a heart attack or stroke? No   2. Have you ever had surgery on your heart or blood vessels, such as a stent placement, a coronary artery bypass, or surgery on an artery in your head, neck, heart, or legs? No   3. Do you have chest pain with  activity? No   4. Do you have a history of  heart failure? No   5. Do you currently have a cold, bronchitis or symptoms of other infection? No   6. Do you have a cough, shortness of breath, or wheezing? No   7. Do you or anyone in your family have previous history of blood clots? No   8. Do you or does anyone in your family have a serious bleeding problem such as prolonged bleeding following surgeries or cuts? No   9. Have you ever had problems with anemia or been told to take iron pills? No   10. Have you had any abnormal blood loss such as black, tarry or bloody stools, or abnormal vaginal bleeding? No   11. Have you ever had a blood transfusion? No   12. Are you willing to have a blood transfusion if it is medically needed before, during, or after your surgery? Yes   13. Have you or any of your relatives ever had problems with anesthesia? No   14. Do you have sleep apnea, excessive snoring or daytime drowsiness? No   15. Do you have any artifical heart valves or other implanted medical devices like a pacemaker, defibrillator, or continuous glucose monitor? No   16. Do you have artificial joints? No   17. Are you allergic to latex? No   18. Is there any chance that you may be pregnant? No       Health Care Directive:  Patient does not have a Health Care Directive or Living Will: Patient states has Advance Directive and will bring in a copy to clinic.    Preoperative Review of :   reviewed - no record of controlled substances prescribed.      Status of Chronic Conditions:  See problem list for active medical problems.  Problems all longstanding and stable, except as noted/documented.  See ROS for pertinent symptoms related to these conditions.    DEPRESSION - Patient has a long history of Depression of moderate severity requiring medication for control with recent symptoms being stable. Ok to continue to take her medication on day of surgery.       Review of Systems  Constitutional, neuro, ENT, endocrine,  pulmonary, cardiac, gastrointestinal, genitourinary, musculoskeletal, integument and psychiatric systems are negative, except as otherwise noted.    Patient Active Problem List    Diagnosis Date Noted     Migraine without aura and without status migrainosus, not intractable 2019     Priority: Medium     Postprandial nausea 2019     Priority: Medium     Encounter for sterilization 10/02/2018     Priority: Medium     History of 2  sections 2016     Priority: Medium     Encounter for supervision of other normal pregnancy 2016     Priority: Medium     History of postpartum depression 2016     Priority: Medium     Migraines 2014     Priority: Medium     Attention deficit disorder of adult 05/15/2013     Priority: Medium      Past Medical History:   Diagnosis Date     Depression      History of macrosomia in infant in prior pregnancy, currently pregnant 2016     History of postpartum depression 2016     Past Surgical History:   Procedure Laterality Date      SECTION       reconstructive knee surgery  1/15/2010     Current Outpatient Medications   Medication Sig Dispense Refill     sertraline (ZOLOFT) 25 MG tablet TAKE 1 TABLET BY MOUTH EVERY DAY 90 tablet 0     cholecalciferol 25 MCG (1000 UT) TABS Take 1 tablet by mouth daily Not currently due to having surgery       fish oil-omega-3 fatty acids 1000 MG capsule Take 1,000 mg by mouth daily Not currently due to having surgery       multivitamin w/minerals (CENTRUM ADULTS) tablet Take 1 tablet by mouth daily Not currently due to having surgery       vitamin B-12 (CYANOCOBALAMIN) 100 MCG tablet Take 1 tablet by mouth daily Not currently due to having surgery         Allergies   Allergen Reactions     Oxycodone Nausea and Vomiting     Seasonal Allergies         Social History     Tobacco Use     Smoking status: Never Smoker     Smokeless tobacco: Never Used   Substance Use Topics     Alcohol use: Yes      "Comment: occasion-not in PG     Family History   Problem Relation Age of Onset     Cancer Mother         cervical cancer     Depression Mother      Other - See Comments Father         pre-diabetes     Diabetes Father      Diabetes Maternal Grandmother      Hypertension Maternal Grandmother      Depression Paternal Grandmother      History   Drug Use No         Objective     /70 (BP Location: Left arm, Patient Position: Sitting, Cuff Size: Adult Regular)   Pulse 69   Temp 97.3  F (36.3  C) (Temporal)   Resp 16   Ht 1.609 m (5' 3.35\")   Wt 65.8 kg (145 lb)   LMP 06/27/2022   SpO2 99%   BMI 25.41 kg/m      Physical Exam    GENERAL APPEARANCE: healthy, alert and no distress     EYES: EOMI, PERRL     HENT: ear canals and TM's normal and nose and mouth without ulcers or lesions     NECK: no adenopathy, no asymmetry, masses, or scars and thyroid normal to palpation     RESP: lungs clear to auscultation - no rales, rhonchi or wheezes     CV: regular rates and rhythm, normal S1 S2, no S3 or S4 and no murmur, click or rub     ABDOMEN:  soft, nontender, no HSM or masses and bowel sounds normal     MS: extremities normal- no gross deformities noted, no evidence of inflammation in joints, FROM in all extremities.     SKIN: no suspicious lesions or rashes     NEURO: Normal strength and tone, sensory exam grossly normal, mentation intact and speech normal     PSYCH: mentation appears normal. and affect normal/bright     LYMPHATICS: No cervical adenopathy    Recent Labs   Lab Test 03/26/21  1503   HGB 14.0         POTASSIUM 3.9   CR 0.71        Diagnostics:  Recent Results (from the past 24 hour(s))   CBC with platelets    Collection Time: 07/05/22  4:59 PM   Result Value Ref Range    WBC Count 6.9 4.0 - 11.0 10e3/uL    RBC Count 4.54 3.80 - 5.20 10e6/uL    Hemoglobin 13.6 11.7 - 15.7 g/dL    Hematocrit 40.0 35.0 - 47.0 %    MCV 88 78 - 100 fL    MCH 30.0 26.5 - 33.0 pg    MCHC 34.0 31.5 - 36.5 g/dL    " RDW 12.6 10.0 - 15.0 %    Platelet Count 289 150 - 450 10e3/uL   HCG qualitative urine    Collection Time: 07/05/22  4:59 PM   Result Value Ref Range    hCG Urine Qualitative Negative Negative      No EKG required for low risk surgery (cataract, skin procedure, breast biopsy, etc).    Revised Cardiac Risk Index (RCRI):  The patient has the following serious cardiovascular risks for perioperative complications:   - No serious cardiac risks = 0 points     RCRI Interpretation: 0 points: Class I (very low risk - 0.4% complication rate)           Signed Electronically by: Ptarick Dillard PA-C  Copy of this evaluation report is provided to requesting physician.

## 2022-08-12 ENCOUNTER — LAB REQUISITION (OUTPATIENT)
Dept: LAB | Facility: CLINIC | Age: 32
End: 2022-08-12
Payer: COMMERCIAL

## 2022-08-12 DIAGNOSIS — L76.31 POSTPROCEDURAL HEMATOMA OF SKIN AND SUBCUTANEOUS TISSUE FOLLOWING A DERMATOLOGIC PROCEDURE: ICD-10-CM

## 2022-08-12 LAB
GRAM STAIN RESULT: ABNORMAL

## 2022-08-12 PROCEDURE — 87205 SMEAR GRAM STAIN: CPT | Mod: ORL | Performed by: PLASTIC SURGERY

## 2022-08-12 PROCEDURE — 87070 CULTURE OTHR SPECIMN AEROBIC: CPT | Mod: ORL | Performed by: PLASTIC SURGERY

## 2022-08-16 LAB — BACTERIA SPEC CULT: ABNORMAL

## 2022-09-09 ENCOUNTER — ALLIED HEALTH/NURSE VISIT (OUTPATIENT)
Dept: FAMILY MEDICINE | Facility: OTHER | Age: 32
End: 2022-09-09
Payer: COMMERCIAL

## 2022-09-09 DIAGNOSIS — Z23 NEED FOR PROPHYLACTIC VACCINATION AND INOCULATION AGAINST INFLUENZA: Primary | ICD-10-CM

## 2022-09-09 PROCEDURE — 90471 IMMUNIZATION ADMIN: CPT

## 2022-09-09 PROCEDURE — 99207 PR NO CHARGE NURSE ONLY: CPT

## 2022-09-09 PROCEDURE — 90686 IIV4 VACC NO PRSV 0.5 ML IM: CPT

## 2022-10-12 DIAGNOSIS — L21.9 SEBORRHEIC DERMATITIS: ICD-10-CM

## 2022-10-17 RX ORDER — TRIAMCINOLONE ACETONIDE 1 MG/G
CREAM TOPICAL
Qty: 30 G | Refills: 0 | Status: SHIPPED | OUTPATIENT
Start: 2022-10-17

## 2022-10-17 NOTE — TELEPHONE ENCOUNTER
"Pending Prescriptions:                       Disp   Refills    triamcinolone (KENALOG) 0.1 % external cre*30 g   0        Sig: APPLY TO AFFECTED AREA TWICE A DAY FOR 10 DAYS        Routing refill request to provider for review/approval because:    Topical Steroids and Nonsteroidals Protocol Failed    Rerun Protocol (10/12/2022 4:06 PM)    Medication is active on med list      Patient is age 6 or older      Authorizing prescriber's most recent note related to this medication read.  If refill request is for ophthalmic use, please forward request to provider for approval.    High potency steroid not ordered      Recent (12 mo) or future (30 days) visit within the authorizing provider's specialty  Patient has had an office visit with the authorizing provider or a provider within the authorizing providers department within the previous 12 mos or has a future within next 30 days. See \"Patient Info\" tab in inbasket, or \"Choose Columns\" in Meds & Orders section of the refill encounter.           "

## 2023-01-10 DIAGNOSIS — F41.1 GAD (GENERALIZED ANXIETY DISORDER): ICD-10-CM

## 2023-01-10 RX ORDER — SERTRALINE HYDROCHLORIDE 25 MG/1
TABLET, FILM COATED ORAL
Qty: 90 TABLET | Refills: 0 | Status: SHIPPED | OUTPATIENT
Start: 2023-01-10 | End: 2023-04-13

## 2023-01-10 NOTE — TELEPHONE ENCOUNTER
Prescription approved per [facility] Refill Protocol.  Inocencia Rivera RN on 1/10/2023 at 3:26 PM

## 2023-04-13 DIAGNOSIS — F41.1 GAD (GENERALIZED ANXIETY DISORDER): ICD-10-CM

## 2023-04-13 RX ORDER — SERTRALINE HYDROCHLORIDE 25 MG/1
TABLET, FILM COATED ORAL
Qty: 30 TABLET | Refills: 1 | Status: SHIPPED | OUTPATIENT
Start: 2023-04-13 | End: 2023-07-31

## 2023-04-13 NOTE — TELEPHONE ENCOUNTER
It has been a year since our last appointment, please schedule short-term refill in the meantime.      Thank you,    Will Mccullough MD    [address] [address]   Ph: [phone]  Fax:[phone]

## 2023-04-23 ENCOUNTER — HEALTH MAINTENANCE LETTER (OUTPATIENT)
Age: 33
End: 2023-04-23

## 2023-05-18 DIAGNOSIS — F41.1 GAD (GENERALIZED ANXIETY DISORDER): ICD-10-CM

## 2023-05-19 RX ORDER — SERTRALINE HYDROCHLORIDE 25 MG/1
TABLET, FILM COATED ORAL
Qty: 30 TABLET | Refills: 1 | OUTPATIENT
Start: 2023-05-19

## 2023-07-31 ENCOUNTER — OFFICE VISIT (OUTPATIENT)
Dept: FAMILY MEDICINE | Facility: OTHER | Age: 33
End: 2023-07-31
Payer: COMMERCIAL

## 2023-07-31 VITALS
BODY MASS INDEX: 28.17 KG/M2 | WEIGHT: 165 LBS | HEART RATE: 61 BPM | DIASTOLIC BLOOD PRESSURE: 68 MMHG | RESPIRATION RATE: 18 BRPM | SYSTOLIC BLOOD PRESSURE: 106 MMHG | HEIGHT: 64 IN | TEMPERATURE: 97.7 F | OXYGEN SATURATION: 100 %

## 2023-07-31 DIAGNOSIS — R53.83 OTHER FATIGUE: ICD-10-CM

## 2023-07-31 DIAGNOSIS — Z13.220 LIPID SCREENING: ICD-10-CM

## 2023-07-31 DIAGNOSIS — Z12.4 CERVICAL CANCER SCREENING: ICD-10-CM

## 2023-07-31 DIAGNOSIS — Z11.59 NEED FOR HEPATITIS C SCREENING TEST: ICD-10-CM

## 2023-07-31 DIAGNOSIS — F41.1 GAD (GENERALIZED ANXIETY DISORDER): Primary | ICD-10-CM

## 2023-07-31 LAB
ANION GAP SERPL CALCULATED.3IONS-SCNC: 9 MMOL/L (ref 7–15)
BUN SERPL-MCNC: 13.8 MG/DL (ref 6–20)
CALCIUM SERPL-MCNC: 8.9 MG/DL (ref 8.6–10)
CHLORIDE SERPL-SCNC: 106 MMOL/L (ref 98–107)
CHOLEST SERPL-MCNC: 168 MG/DL
CREAT SERPL-MCNC: 0.62 MG/DL (ref 0.51–0.95)
DEPRECATED HCO3 PLAS-SCNC: 24 MMOL/L (ref 22–29)
GFR SERPL CREATININE-BSD FRML MDRD: >90 ML/MIN/1.73M2
GLUCOSE SERPL-MCNC: 82 MG/DL (ref 70–99)
HDLC SERPL-MCNC: 59 MG/DL
HGB BLD-MCNC: 12.4 G/DL (ref 11.7–15.7)
LDLC SERPL CALC-MCNC: 96 MG/DL
NONHDLC SERPL-MCNC: 109 MG/DL
POTASSIUM SERPL-SCNC: 4 MMOL/L (ref 3.4–5.3)
SODIUM SERPL-SCNC: 139 MMOL/L (ref 136–145)
TRIGL SERPL-MCNC: 63 MG/DL
TSH SERPL DL<=0.005 MIU/L-ACNC: 1.95 UIU/ML (ref 0.3–4.2)

## 2023-07-31 PROCEDURE — 36415 COLL VENOUS BLD VENIPUNCTURE: CPT | Performed by: STUDENT IN AN ORGANIZED HEALTH CARE EDUCATION/TRAINING PROGRAM

## 2023-07-31 PROCEDURE — 80048 BASIC METABOLIC PNL TOTAL CA: CPT | Performed by: STUDENT IN AN ORGANIZED HEALTH CARE EDUCATION/TRAINING PROGRAM

## 2023-07-31 PROCEDURE — 86803 HEPATITIS C AB TEST: CPT | Performed by: STUDENT IN AN ORGANIZED HEALTH CARE EDUCATION/TRAINING PROGRAM

## 2023-07-31 PROCEDURE — 85018 HEMOGLOBIN: CPT | Performed by: STUDENT IN AN ORGANIZED HEALTH CARE EDUCATION/TRAINING PROGRAM

## 2023-07-31 PROCEDURE — 82607 VITAMIN B-12: CPT | Performed by: STUDENT IN AN ORGANIZED HEALTH CARE EDUCATION/TRAINING PROGRAM

## 2023-07-31 PROCEDURE — 80061 LIPID PANEL: CPT | Performed by: STUDENT IN AN ORGANIZED HEALTH CARE EDUCATION/TRAINING PROGRAM

## 2023-07-31 PROCEDURE — 84443 ASSAY THYROID STIM HORMONE: CPT | Performed by: STUDENT IN AN ORGANIZED HEALTH CARE EDUCATION/TRAINING PROGRAM

## 2023-07-31 PROCEDURE — 99214 OFFICE O/P EST MOD 30 MIN: CPT | Performed by: STUDENT IN AN ORGANIZED HEALTH CARE EDUCATION/TRAINING PROGRAM

## 2023-07-31 RX ORDER — SERTRALINE HYDROCHLORIDE 25 MG/1
25 TABLET, FILM COATED ORAL DAILY
Qty: 30 TABLET | Refills: 1 | Status: CANCELLED | OUTPATIENT
Start: 2023-07-31

## 2023-07-31 ASSESSMENT — ANXIETY QUESTIONNAIRES
7. FEELING AFRAID AS IF SOMETHING AWFUL MIGHT HAPPEN: SEVERAL DAYS
4. TROUBLE RELAXING: SEVERAL DAYS
3. WORRYING TOO MUCH ABOUT DIFFERENT THINGS: MORE THAN HALF THE DAYS
IF YOU CHECKED OFF ANY PROBLEMS ON THIS QUESTIONNAIRE, HOW DIFFICULT HAVE THESE PROBLEMS MADE IT FOR YOU TO DO YOUR WORK, TAKE CARE OF THINGS AT HOME, OR GET ALONG WITH OTHER PEOPLE: SOMEWHAT DIFFICULT
5. BEING SO RESTLESS THAT IT IS HARD TO SIT STILL: SEVERAL DAYS
1. FEELING NERVOUS, ANXIOUS, OR ON EDGE: MORE THAN HALF THE DAYS
GAD7 TOTAL SCORE: 11
8. IF YOU CHECKED OFF ANY PROBLEMS, HOW DIFFICULT HAVE THESE MADE IT FOR YOU TO DO YOUR WORK, TAKE CARE OF THINGS AT HOME, OR GET ALONG WITH OTHER PEOPLE?: SOMEWHAT DIFFICULT
6. BECOMING EASILY ANNOYED OR IRRITABLE: MORE THAN HALF THE DAYS
GAD7 TOTAL SCORE: 11
7. FEELING AFRAID AS IF SOMETHING AWFUL MIGHT HAPPEN: SEVERAL DAYS
2. NOT BEING ABLE TO STOP OR CONTROL WORRYING: MORE THAN HALF THE DAYS
GAD7 TOTAL SCORE: 11

## 2023-07-31 ASSESSMENT — PATIENT HEALTH QUESTIONNAIRE - PHQ9
SUM OF ALL RESPONSES TO PHQ QUESTIONS 1-9: 9
10. IF YOU CHECKED OFF ANY PROBLEMS, HOW DIFFICULT HAVE THESE PROBLEMS MADE IT FOR YOU TO DO YOUR WORK, TAKE CARE OF THINGS AT HOME, OR GET ALONG WITH OTHER PEOPLE: NOT DIFFICULT AT ALL
SUM OF ALL RESPONSES TO PHQ QUESTIONS 1-9: 9

## 2023-07-31 NOTE — PROGRESS NOTES
Assessment & Plan     WHITNEY (generalized anxiety disorder)  Patient is any more control of her mood symptoms.  Discussed about treatment options including therapy, daily medications, lifestyle intervention.  She does have a busy home life and therapy may be challenging currently but will trial daily medications.  Has had success minimal success with low-dose sertraline in the past, will continue this at increased dosage.  Would likely recheck though coming month, AVS as outlined therapy options, lifestyle intervention, and emergency contacts if further evaluation is required.  - sertraline (ZOLOFT) 50 MG tablet; Take 1.5 tablets (75 mg) by mouth daily for 30 days    Need for hepatitis C screening test  - Hepatitis C Screen Reflex to HCV RNA Quant and Genotype    Other fatigue  Mood likely contributing, labs as below  - Basic metabolic panel  (Ca, Cl, CO2, Creat, Gluc, K, Na, BUN)  - Hemoglobin  - TSH with free T4 reflex  - Vitamin B12    Lipid screening  - Lipid panel reflex to direct LDL Non-fasting      ANTHONY TIJERINA MD  [facility]    Pedro Fajardo is a 32 year old, presenting for the following health issues:  Recheck Medication      7/31/2023     2:08 PM   Additional Questions   Roomed by Emma AMARO   Accompanied by kids are with       History of Present Illness       Mental Health Follow-up:  Patient presents to follow-up on Anxiety.    Patient's anxiety since last visit has been:  Medium  The patient is not having other symptoms associated with anxiety.  Any significant life events: No  Patient is not feeling anxious or having panic attacks.  Patient has no concerns about alcohol or drug use.    She eats 2-3 servings of fruits and vegetables daily.She consumes 1 sweetened beverage(s) daily.She exercises with enough effort to increase her heart rate 60 or more minutes per day.  She exercises with enough effort to increase her heart rate 5 days per week. She is missing 7 dose(s)  "of medications per week.    She would like to request to have some blood work drawn today, specifically like vitamins looking for any deficiencies.         Review of Systems   Constitutional, HEENT, cardiovascular, pulmonary, gi and gu systems are negative, except as otherwise noted.      Objective    /68   Pulse 61   Temp 97.7  F (36.5  C) (Temporal)   Resp 18   Ht 1.613 m (5' 3.5\")   Wt 74.8 kg (165 lb)   LMP 07/27/2023   SpO2 100%   BMI 28.77 kg/m    Body mass index is 28.77 kg/m .  Physical Exam         "

## 2023-07-31 NOTE — PATIENT INSTRUCTIONS
Keys to success to help control anxiety, depression, and/or stress    -Physical activity in any way every day even simply going for a walk    -Getting quality sleep every day and maintain a sleep schedule by going to sleep and waking up at the same time every day    -Eating 3 good/healthy meals every day    -Consider mindful activities such as meditation (consider the apps such as Calm or Headspace), yoga, piliates, etc...     -Consider relaxation techniques such as massage, yoga, spa time, use of essential oils    -Removed possible triggers of anxiety or depression (caffeine - nothing past lunch time, stimulants, nicotine, dietary triggers, stress)    -Light therapy. Simply being outside in sunlight or consider buying a Happy Light    -If you are on medication make sure you are taking it appropriately and as prescribed    -Talking things over with a friend or loved one, even consider formal therapy    -Over the counter medicines such as Vitamin D (8979-6345 international unit(s)), activated folic acid (L-methyl-folate), B vitamins, Omega 3 fatty acids (fish oil)    -Breathing exercises (consider the jace BreathWork)        Therapy options in the area      [facility] [place]  Counseling & mental health  [address]   [phone]      [facility]  Counseling & mental health  [address] [facility]   [phone]      [facility] [place]  Counseling & mental health  [address] [facility]   [phone]      [facility]  Counseling & mental health  [address] [facility]   [phone]      [facility]  [address], [place] [address] [phone], [email or web address]      [facility]  Health & medical  [address]   [phone]      [facility]  [address] [place] [address] [phone]  [email or web address]      [facility] [place]  [phone]  [email or web address]      [facility]  Counseling & mental health  [address] [facility] [address], [place]   [phone]      [email or web address]  Gives a wide network of psychologist/therapy options (can search based off of city or state)        OR call 911 OR report to the closest emergency department in the event of a emergency      National Suicide Prevention Lifeline - 988  The 988 Suicide and Crisis Lifeline     How to access the Lifeline:    Call 1-820.775.4798  Call 988 - services available in English and Brazilian, interpretation services in over 150 languages  Text 988 (English only)  Chat using the Lifeline website (English only)     Key Details:    The 988 dialing code will operate through the existing Lifeline number (1-267.674.9811); the 10-digit number will not go away.  988 is confidential, free, and available 24/7/365  988 is accessible through every land line, cell phone, and voice-over internet device in the [place]      Reviewed: 2023

## 2023-08-01 LAB
HCV AB SERPL QL IA: NONREACTIVE
VIT B12 SERPL-MCNC: 541 PG/ML (ref 232–1245)

## 2023-08-01 NOTE — RESULT ENCOUNTER NOTE
Scotty,    Your results are within normal limits/negative and nothing else needs to be done at this time.       If you have any questions feel free to call the clinic at [phone].      Thank you,    Will Mccullough MD

## 2023-08-22 DIAGNOSIS — F41.1 GAD (GENERALIZED ANXIETY DISORDER): ICD-10-CM

## 2023-08-30 ENCOUNTER — VIRTUAL VISIT (OUTPATIENT)
Dept: FAMILY MEDICINE | Facility: OTHER | Age: 33
End: 2023-08-30
Payer: COMMERCIAL

## 2023-08-30 ENCOUNTER — MYC MEDICAL ADVICE (OUTPATIENT)
Dept: FAMILY MEDICINE | Facility: OTHER | Age: 33
End: 2023-08-30

## 2023-08-30 DIAGNOSIS — F41.1 GAD (GENERALIZED ANXIETY DISORDER): ICD-10-CM

## 2023-08-30 PROCEDURE — 99213 OFFICE O/P EST LOW 20 MIN: CPT | Mod: 93 | Performed by: STUDENT IN AN ORGANIZED HEALTH CARE EDUCATION/TRAINING PROGRAM

## 2023-08-30 ASSESSMENT — ANXIETY QUESTIONNAIRES
4. TROUBLE RELAXING: NOT AT ALL
1. FEELING NERVOUS, ANXIOUS, OR ON EDGE: NOT AT ALL
7. FEELING AFRAID AS IF SOMETHING AWFUL MIGHT HAPPEN: NOT AT ALL
2. NOT BEING ABLE TO STOP OR CONTROL WORRYING: NOT AT ALL
3. WORRYING TOO MUCH ABOUT DIFFERENT THINGS: SEVERAL DAYS
5. BEING SO RESTLESS THAT IT IS HARD TO SIT STILL: SEVERAL DAYS
GAD7 TOTAL SCORE: 3
6. BECOMING EASILY ANNOYED OR IRRITABLE: SEVERAL DAYS
GAD7 TOTAL SCORE: 3
IF YOU CHECKED OFF ANY PROBLEMS ON THIS QUESTIONNAIRE, HOW DIFFICULT HAVE THESE PROBLEMS MADE IT FOR YOU TO DO YOUR WORK, TAKE CARE OF THINGS AT HOME, OR GET ALONG WITH OTHER PEOPLE: NOT DIFFICULT AT ALL

## 2023-08-30 NOTE — PROGRESS NOTES
Scotty is a 32 year old who is being evaluated via a billable telephone visit.      What phone number would you like to be contacted at? [phone]  How would you like to obtain your AVS? Cody    Distant Location (provider location):  On-site    Assessment & Plan     WHITNEY (generalized anxiety disorder)  Overall much improved compared to previous visit, PHQ and WHITNEY reviewed, WHITNEY Much improved left compared to last visit.  Patient has preferred the 50 mg rather than 75's.  She was having some slight side effects and overall feels more comfortable with 50 mg currently.  We will keep her at this dosage, plan for follow-up in 3 months or as needed  - sertraline (ZOLOFT) 50 MG tablet; Take 1 tablet (50 mg) by mouth daily    ANTHONY TIJERINA MD  [facility]   Scotty is a 32 year old, presenting for the following health issues:  mood fu        8/30/2023     4:48 PM   Additional Questions   Roomed by jace   Accompanied by self       History of Present Illness       Mental Health Follow-up:  Patient presents to follow-up on Anxiety.    Patient's anxiety since last visit has been:  Good  The patient is not having other symptoms associated with anxiety.  Any significant life events: No  Patient is not feeling anxious or having panic attacks.  Patient has no concerns about alcohol or drug use.        Review of Systems   Constitutional, HEENT, cardiovascular, pulmonary, gi and gu systems are negative, except as otherwise noted.      Objective    Vitals - Patient Reported  Pain Score: No Pain (0)        Physical Exam   healthy, alert, and no distress  PSYCH: Alert and oriented times 3; coherent speech, normal   rate and volume, able to articulate logical thoughts, able   to abstract reason, no tangential thoughts, no hallucinations   or delusions  Her affect is normal  RESP: No cough, no audible wheezing, able to talk in full sentences  Remainder of exam unable to be completed due to  telephone visits            Phone call duration: 6 minutes

## 2024-06-30 ENCOUNTER — HEALTH MAINTENANCE LETTER (OUTPATIENT)
Age: 34
End: 2024-06-30

## 2025-07-13 ENCOUNTER — HEALTH MAINTENANCE LETTER (OUTPATIENT)
Age: 35
End: 2025-07-13